# Patient Record
Sex: FEMALE | Race: WHITE
[De-identification: names, ages, dates, MRNs, and addresses within clinical notes are randomized per-mention and may not be internally consistent; named-entity substitution may affect disease eponyms.]

---

## 2017-12-11 ENCOUNTER — HOSPITAL ENCOUNTER (OUTPATIENT)
Dept: HOSPITAL 62 - OD | Age: 62
End: 2017-12-11
Attending: FAMILY MEDICINE
Payer: COMMERCIAL

## 2017-12-11 DIAGNOSIS — Z79.899: Primary | ICD-10-CM

## 2017-12-11 LAB
ALBUMIN SERPL-MCNC: 3.9 G/DL (ref 3.5–5)
ALP SERPL-CCNC: 114 U/L (ref 38–126)
ALT SERPL-CCNC: 53 U/L (ref 9–52)
ANION GAP SERPL CALC-SCNC: 14 MMOL/L (ref 5–19)
AST SERPL-CCNC: 42 U/L (ref 14–36)
BASOPHILS # BLD AUTO: 0 10^3/UL (ref 0–0.2)
BASOPHILS NFR BLD AUTO: 0.4 % (ref 0–2)
BILIRUB DIRECT SERPL-MCNC: 0.5 MG/DL (ref 0–0.4)
BILIRUB SERPL-MCNC: 1.1 MG/DL (ref 0.2–1.3)
BUN SERPL-MCNC: 15 MG/DL (ref 7–20)
CALCIUM: 8.8 MG/DL (ref 8.4–10.2)
CHLORIDE SERPL-SCNC: 105 MMOL/L (ref 98–107)
CHOLEST SERPL-MCNC: 158.98 MG/DL (ref 0–200)
CO2 SERPL-SCNC: 25 MMOL/L (ref 22–30)
CREAT SERPL-MCNC: 0.77 MG/DL (ref 0.52–1.25)
DIRECT HDL: 49 MG/DL (ref 40–?)
EOSINOPHIL # BLD AUTO: 0.1 10^3/UL (ref 0–0.6)
EOSINOPHIL NFR BLD AUTO: 1.3 % (ref 0–6)
ERYTHROCYTE [DISTWIDTH] IN BLOOD BY AUTOMATED COUNT: 15.1 % (ref 11.5–14)
GLUCOSE SERPL-MCNC: 169 MG/DL (ref 75–110)
HCT VFR BLD CALC: 44 % (ref 36–47)
HGB BLD-MCNC: 14.6 G/DL (ref 12–15.5)
HGB HCT DIFFERENCE: -0.2
LDLC SERPL DIRECT ASSAY-MCNC: 87 MG/DL (ref ?–100)
LYMPHOCYTES # BLD AUTO: 0.8 10^3/UL (ref 0.5–4.7)
LYMPHOCYTES NFR BLD AUTO: 11.2 % (ref 13–45)
MCH RBC QN AUTO: 28.9 PG (ref 27–33.4)
MCHC RBC AUTO-ENTMCNC: 33.3 G/DL (ref 32–36)
MCV RBC AUTO: 87 FL (ref 80–97)
MONOCYTES # BLD AUTO: 0.4 10^3/UL (ref 0.1–1.4)
MONOCYTES NFR BLD AUTO: 5.2 % (ref 3–13)
NEUTROPHILS # BLD AUTO: 5.7 10^3/UL (ref 1.7–8.2)
NEUTS SEG NFR BLD AUTO: 81.9 % (ref 42–78)
POTASSIUM SERPL-SCNC: 3.9 MMOL/L (ref 3.6–5)
PROT SERPL-MCNC: 7.1 G/DL (ref 6.3–8.2)
RBC # BLD AUTO: 5.07 10^6/UL (ref 3.72–5.28)
SODIUM SERPL-SCNC: 143.7 MMOL/L (ref 137–145)
TRIGL SERPL-MCNC: 117 MG/DL (ref ?–150)
VLDLC SERPL CALC-MCNC: 23 MG/DL (ref 10–31)
WBC # BLD AUTO: 7 10^3/UL (ref 4–10.5)

## 2017-12-11 PROCEDURE — 80061 LIPID PANEL: CPT

## 2017-12-11 PROCEDURE — 36415 COLL VENOUS BLD VENIPUNCTURE: CPT

## 2017-12-11 PROCEDURE — 82043 UR ALBUMIN QUANTITATIVE: CPT

## 2017-12-11 PROCEDURE — 80053 COMPREHEN METABOLIC PANEL: CPT

## 2017-12-11 PROCEDURE — 83036 HEMOGLOBIN GLYCOSYLATED A1C: CPT

## 2017-12-11 PROCEDURE — 82570 ASSAY OF URINE CREATININE: CPT

## 2017-12-11 PROCEDURE — 84443 ASSAY THYROID STIM HORMONE: CPT

## 2017-12-11 PROCEDURE — 85025 COMPLETE CBC W/AUTO DIFF WBC: CPT

## 2017-12-12 LAB
CREAT UR-MCNC: 212.3 MG/DL
MICROALBUMIN UR-MCNC: 63.8 UG/ML

## 2018-02-16 ENCOUNTER — HOSPITAL ENCOUNTER (OUTPATIENT)
Dept: HOSPITAL 62 - OD | Age: 63
End: 2018-02-16
Attending: FAMILY MEDICINE
Payer: COMMERCIAL

## 2018-02-16 DIAGNOSIS — R79.89: ICD-10-CM

## 2018-02-16 DIAGNOSIS — E11.9: Primary | ICD-10-CM

## 2018-02-16 DIAGNOSIS — Z79.899: ICD-10-CM

## 2018-02-16 LAB
ALT SERPL-CCNC: 38 U/L (ref 9–52)
AST SERPL-CCNC: 37 U/L (ref 14–36)

## 2018-02-16 PROCEDURE — 84460 ALANINE AMINO (ALT) (SGPT): CPT

## 2018-02-16 PROCEDURE — 84450 TRANSFERASE (AST) (SGOT): CPT

## 2018-02-16 PROCEDURE — 36415 COLL VENOUS BLD VENIPUNCTURE: CPT

## 2018-02-16 PROCEDURE — 83036 HEMOGLOBIN GLYCOSYLATED A1C: CPT

## 2018-04-04 ENCOUNTER — HOSPITAL ENCOUNTER (OUTPATIENT)
Dept: HOSPITAL 62 - OD | Age: 63
End: 2018-04-04
Attending: FAMILY MEDICINE
Payer: COMMERCIAL

## 2018-04-04 DIAGNOSIS — M51.37: ICD-10-CM

## 2018-04-04 DIAGNOSIS — M54.5: Primary | ICD-10-CM

## 2018-04-04 PROCEDURE — 72110 X-RAY EXAM L-2 SPINE 4/>VWS: CPT

## 2018-04-04 PROCEDURE — 72220 X-RAY EXAM SACRUM TAILBONE: CPT

## 2018-04-04 NOTE — RADIOLOGY REPORT (SQ)
EXAM DESCRIPTION:  LUMBAR SPINE COMPLETE



COMPLETED DATE/TIME:  4/4/2018 8:18 am



REASON FOR STUDY:  LOW BACK PAIN



COMPARISON:  Sacrum films same day



NUMBER OF VIEWS:  Five views including obliques.



TECHNIQUE:  AP, lateral, oblique, and sacral radiographic images acquired of the lumbar spine.



LIMITATIONS:  Obese patient



FINDINGS:  MINERALIZATION: Normal.

SEGMENTATION: Normal.  No transitional anatomy.

ALIGNMENT: Normal.

VERTEBRAE: Maintained height.  No fracture or worrisome bone lesion.

DISCS: High-grade disc space loss of height at T11-12, L2-3, and L5-S1.  Moderate disc space narrowin
g at L3-4 and L4-5.

POSTERIOR ELEMENTS: Pedicles and facets are intact.  No pars defect or posterior arch defects.  Moder
ate bilateral facet arthropathy at L5-S1

HARDWARE: None in the spine.

PARASPINAL SOFT TISSUES: Normal.

PELVIS: Not included in the field of view

OTHER: No other significant finding.



IMPRESSION:  Multilevel degenerative disc changes



TECHNICAL DOCUMENTATION:  JOB ID:  [id number]

 2011 [employer]- All Rights Reserved



Reading location - IP/workstation name: [email or web address]

## 2018-04-04 NOTE — RADIOLOGY REPORT (SQ)
EXAM DESCRIPTION:  SACRUM AND COCCYX



COMPLETED DATE/TIME:  4/4/2018 8:18 am



REASON FOR STUDY:  LOW BACK PAIN



COMPARISON:  Lumbar spine films 4/4/2018



NUMBER OF VIEWS:  Three views.



TECHNIQUE:  AP, lateral, and tilt views of the sacrum and coccyx.



LIMITATIONS:  Obese patient, limited visualization



FINDINGS:  Limited study due to patient body habitus.

There is no acute fracture or malalignment.

High-grade disc space loss of height at L5-S1.

Angulation of the sacrococcygeal junction at the sacrococcygeal joint.  This is likely chronic.

There is vacuum phenomenon at the SI joints and symphysis pubis.



IMPRESSION:  Vacuum joint changes at the SI joints and symphysis pubis



TECHNICAL DOCUMENTATION:  JOB ID:  [id number]

 2011 [employer]- All Rights Reserved



Reading location - IP/workstation name: [email or web address]

## 2018-07-02 ENCOUNTER — HOSPITAL ENCOUNTER (OUTPATIENT)
Dept: HOSPITAL 62 - OD | Age: 63
End: 2018-07-02
Attending: FAMILY MEDICINE
Payer: COMMERCIAL

## 2018-07-02 DIAGNOSIS — E11.9: Primary | ICD-10-CM

## 2018-07-02 DIAGNOSIS — Z79.899: ICD-10-CM

## 2018-07-02 DIAGNOSIS — E66.9: ICD-10-CM

## 2018-07-02 DIAGNOSIS — I10: ICD-10-CM

## 2018-07-02 LAB
ADD MANUAL DIFF: NO
ALBUMIN SERPL-MCNC: 3.7 G/DL (ref 3.5–5)
ALP SERPL-CCNC: 99 U/L (ref 38–126)
ALT SERPL-CCNC: 27 U/L (ref 9–52)
ANION GAP SERPL CALC-SCNC: 14 MMOL/L (ref 5–19)
AST SERPL-CCNC: 40 U/L (ref 14–36)
BASOPHILS # BLD AUTO: 0 10^3/UL (ref 0–0.2)
BASOPHILS NFR BLD AUTO: 0.5 % (ref 0–2)
BILIRUB DIRECT SERPL-MCNC: 0.4 MG/DL (ref 0–0.4)
BILIRUB SERPL-MCNC: 0.8 MG/DL (ref 0.2–1.3)
BUN SERPL-MCNC: 17 MG/DL (ref 7–20)
CALCIUM: 9.2 MG/DL (ref 8.4–10.2)
CHLORIDE SERPL-SCNC: 103 MMOL/L (ref 98–107)
CHOLEST SERPL-MCNC: 154.94 MG/DL (ref 0–200)
CO2 SERPL-SCNC: 26 MMOL/L (ref 22–30)
EOSINOPHIL # BLD AUTO: 0.1 10^3/UL (ref 0–0.6)
EOSINOPHIL NFR BLD AUTO: 1.2 % (ref 0–6)
ERYTHROCYTE [DISTWIDTH] IN BLOOD BY AUTOMATED COUNT: 15.9 % (ref 11.5–14)
GLUCOSE SERPL-MCNC: 134 MG/DL (ref 75–110)
HCT VFR BLD CALC: 42.1 % (ref 36–47)
HGB BLD-MCNC: 14.3 G/DL (ref 12–15.5)
LDLC SERPL DIRECT ASSAY-MCNC: 80 MG/DL (ref ?–100)
LYMPHOCYTES # BLD AUTO: 0.8 10^3/UL (ref 0.5–4.7)
LYMPHOCYTES NFR BLD AUTO: 15.6 % (ref 13–45)
MCH RBC QN AUTO: 29.3 PG (ref 27–33.4)
MCHC RBC AUTO-ENTMCNC: 33.8 G/DL (ref 32–36)
MCV RBC AUTO: 87 FL (ref 80–97)
MONOCYTES # BLD AUTO: 0.3 10^3/UL (ref 0.1–1.4)
MONOCYTES NFR BLD AUTO: 5.8 % (ref 3–13)
NEUTROPHILS # BLD AUTO: 4 10^3/UL (ref 1.7–8.2)
NEUTS SEG NFR BLD AUTO: 76.9 % (ref 42–78)
PLATELET # BLD: 226 10^3/UL (ref 150–450)
POTASSIUM SERPL-SCNC: 3.6 MMOL/L (ref 3.6–5)
PROT SERPL-MCNC: 7 G/DL (ref 6.3–8.2)
RBC # BLD AUTO: 4.87 10^6/UL (ref 3.72–5.28)
SODIUM SERPL-SCNC: 142.6 MMOL/L (ref 137–145)
TOTAL CELLS COUNTED % (AUTO): 100 %
TRIGL SERPL-MCNC: 146 MG/DL (ref ?–150)
VLDLC SERPL CALC-MCNC: 29 MG/DL (ref 10–31)
WBC # BLD AUTO: 5.2 10^3/UL (ref 4–10.5)

## 2018-07-02 PROCEDURE — 80061 LIPID PANEL: CPT

## 2018-07-02 PROCEDURE — 80053 COMPREHEN METABOLIC PANEL: CPT

## 2018-07-02 PROCEDURE — 36415 COLL VENOUS BLD VENIPUNCTURE: CPT

## 2018-07-02 PROCEDURE — 83036 HEMOGLOBIN GLYCOSYLATED A1C: CPT

## 2018-07-02 PROCEDURE — 85025 COMPLETE CBC W/AUTO DIFF WBC: CPT

## 2018-07-02 PROCEDURE — 82043 UR ALBUMIN QUANTITATIVE: CPT

## 2018-07-02 PROCEDURE — 82570 ASSAY OF URINE CREATININE: CPT

## 2018-07-02 PROCEDURE — 84443 ASSAY THYROID STIM HORMONE: CPT

## 2018-07-03 LAB
CREAT UR-MCNC: 195.8 MG/DL
MICROALBUMIN UR-MCNC: 37.5 UG/ML

## 2018-11-28 ENCOUNTER — HOSPITAL ENCOUNTER (INPATIENT)
Dept: HOSPITAL 62 - ER | Age: 63
LOS: 6 days | Discharge: HOME HEALTH | DRG: 189 | End: 2018-12-04
Attending: INTERNAL MEDICINE | Admitting: INTERNAL MEDICINE
Payer: COMMERCIAL

## 2018-11-28 DIAGNOSIS — Z90.49: ICD-10-CM

## 2018-11-28 DIAGNOSIS — I10: ICD-10-CM

## 2018-11-28 DIAGNOSIS — Z79.84: ICD-10-CM

## 2018-11-28 DIAGNOSIS — J96.01: Primary | ICD-10-CM

## 2018-11-28 DIAGNOSIS — Z87.891: ICD-10-CM

## 2018-11-28 DIAGNOSIS — E11.9: ICD-10-CM

## 2018-11-28 DIAGNOSIS — J45.909: ICD-10-CM

## 2018-11-28 DIAGNOSIS — Z71.3: ICD-10-CM

## 2018-11-28 DIAGNOSIS — K21.9: ICD-10-CM

## 2018-11-28 DIAGNOSIS — E66.01: ICD-10-CM

## 2018-11-28 DIAGNOSIS — Z79.899: ICD-10-CM

## 2018-11-28 LAB
A TYPE INFLUENZA AG: NEGATIVE
ADD MANUAL DIFF: NO
ALBUMIN SERPL-MCNC: 4 G/DL (ref 3.5–5)
ALP SERPL-CCNC: 127 U/L (ref 38–126)
ALT SERPL-CCNC: 30 U/L (ref 9–52)
ANION GAP SERPL CALC-SCNC: 14 MMOL/L (ref 5–19)
ARTERIAL BLOOD FIO2: (no result)
ARTERIAL BLOOD H2CO3: 1.31 MMOL/L (ref 1.05–1.35)
ARTERIAL BLOOD HCO3: 28.7 MMOL/L (ref 20–24)
ARTERIAL BLOOD PCO2: 43.4 MMHG (ref 35–45)
ARTERIAL BLOOD PH: 7.44 (ref 7.35–7.45)
ARTERIAL BLOOD PO2: 107.2 MMHG (ref 80–100)
ARTERIAL BLOOD TOTAL CO2: 30.1 MMOL/L (ref 21–25)
AST SERPL-CCNC: 53 U/L (ref 14–36)
B INFLUENZA AG: NEGATIVE
BASE EXCESS BLDA CALC-SCNC: 4.1 MMOL/L
BASE EXCESS BLDV CALC-SCNC: 1.4 MMOL/L
BASOPHILS # BLD AUTO: 0 10^3/UL (ref 0–0.2)
BASOPHILS NFR BLD AUTO: 0.4 % (ref 0–2)
BILIRUB DIRECT SERPL-MCNC: 0.3 MG/DL (ref 0–0.4)
BILIRUB SERPL-MCNC: 1 MG/DL (ref 0.2–1.3)
BUN SERPL-MCNC: 19 MG/DL (ref 7–20)
CALCIUM: 9 MG/DL (ref 8.4–10.2)
CHLORIDE SERPL-SCNC: 100 MMOL/L (ref 98–107)
CO2 SERPL-SCNC: 27 MMOL/L (ref 22–30)
EOSINOPHIL # BLD AUTO: 0.1 10^3/UL (ref 0–0.6)
EOSINOPHIL NFR BLD AUTO: 1.3 % (ref 0–6)
ERYTHROCYTE [DISTWIDTH] IN BLOOD BY AUTOMATED COUNT: 15.3 % (ref 11.5–14)
GLUCOSE SERPL-MCNC: 176 MG/DL (ref 75–110)
HCO3 BLDV-SCNC: 26.7 MMOL/L (ref 20–32)
HCT VFR BLD CALC: 43.4 % (ref 36–47)
HGB BLD-MCNC: 14.3 G/DL (ref 12–15.5)
LYMPHOCYTES # BLD AUTO: 1.1 10^3/UL (ref 0.5–4.7)
LYMPHOCYTES NFR BLD AUTO: 13 % (ref 13–45)
MCH RBC QN AUTO: 28.5 PG (ref 27–33.4)
MCHC RBC AUTO-ENTMCNC: 32.9 G/DL (ref 32–36)
MCV RBC AUTO: 87 FL (ref 80–97)
MONOCYTES # BLD AUTO: 0.6 10^3/UL (ref 0.1–1.4)
MONOCYTES NFR BLD AUTO: 6.9 % (ref 3–13)
NEUTROPHILS # BLD AUTO: 6.3 10^3/UL (ref 1.7–8.2)
NEUTS SEG NFR BLD AUTO: 78.4 % (ref 42–78)
PCO2 BLDV: 44.8 MMHG (ref 35–63)
PH BLDV: 7.39 [PH] (ref 7.3–7.42)
PLATELET # BLD: 428 10^3/UL (ref 150–450)
POTASSIUM SERPL-SCNC: 3.7 MMOL/L (ref 3.6–5)
PROT SERPL-MCNC: 8.1 G/DL (ref 6.3–8.2)
RBC # BLD AUTO: 5.02 10^6/UL (ref 3.72–5.28)
SAO2 % BLDA: 98 % (ref 94–98)
SODIUM SERPL-SCNC: 141 MMOL/L (ref 137–145)
TOTAL CELLS COUNTED % (AUTO): 100 %
WBC # BLD AUTO: 8 10^3/UL (ref 4–10.5)

## 2018-11-28 PROCEDURE — 80048 BASIC METABOLIC PNL TOTAL CA: CPT

## 2018-11-28 PROCEDURE — 80053 COMPREHEN METABOLIC PANEL: CPT

## 2018-11-28 PROCEDURE — 87205 SMEAR GRAM STAIN: CPT

## 2018-11-28 PROCEDURE — 93010 ELECTROCARDIOGRAM REPORT: CPT

## 2018-11-28 PROCEDURE — 87804 INFLUENZA ASSAY W/OPTIC: CPT

## 2018-11-28 PROCEDURE — 85025 COMPLETE CBC W/AUTO DIFF WBC: CPT

## 2018-11-28 PROCEDURE — 87186 SC STD MICRODIL/AGAR DIL: CPT

## 2018-11-28 PROCEDURE — 84484 ASSAY OF TROPONIN QUANT: CPT

## 2018-11-28 PROCEDURE — 5A09357 ASSISTANCE WITH RESPIRATORY VENTILATION, LESS THAN 24 CONSECUTIVE HOURS, CONTINUOUS POSITIVE AIRWAY PRESSURE: ICD-10-PCS | Performed by: INTERNAL MEDICINE

## 2018-11-28 PROCEDURE — 87070 CULTURE OTHR SPECIMN AEROBIC: CPT

## 2018-11-28 PROCEDURE — 99285 EMERGENCY DEPT VISIT HI MDM: CPT

## 2018-11-28 PROCEDURE — 94660 CPAP INITIATION&MGMT: CPT

## 2018-11-28 PROCEDURE — 36600 WITHDRAWAL OF ARTERIAL BLOOD: CPT

## 2018-11-28 PROCEDURE — 83735 ASSAY OF MAGNESIUM: CPT

## 2018-11-28 PROCEDURE — 82962 GLUCOSE BLOOD TEST: CPT

## 2018-11-28 PROCEDURE — 94640 AIRWAY INHALATION TREATMENT: CPT

## 2018-11-28 PROCEDURE — 96365 THER/PROPH/DIAG IV INF INIT: CPT

## 2018-11-28 PROCEDURE — 87493 C DIFF AMPLIFIED PROBE: CPT

## 2018-11-28 PROCEDURE — S0028 INJECTION, FAMOTIDINE, 20 MG: HCPCS

## 2018-11-28 PROCEDURE — 93306 TTE W/DOPPLER COMPLETE: CPT

## 2018-11-28 PROCEDURE — 93005 ELECTROCARDIOGRAM TRACING: CPT

## 2018-11-28 PROCEDURE — 71045 X-RAY EXAM CHEST 1 VIEW: CPT

## 2018-11-28 PROCEDURE — 82803 BLOOD GASES ANY COMBINATION: CPT

## 2018-11-28 PROCEDURE — 87077 CULTURE AEROBIC IDENTIFY: CPT

## 2018-11-28 PROCEDURE — 84100 ASSAY OF PHOSPHORUS: CPT

## 2018-11-28 PROCEDURE — 90686 IIV4 VACC NO PRSV 0.5 ML IM: CPT

## 2018-11-28 PROCEDURE — 36415 COLL VENOUS BLD VENIPUNCTURE: CPT

## 2018-11-28 PROCEDURE — S0164 INJECTION PANTROPRAZOLE: HCPCS

## 2018-11-28 PROCEDURE — 70360 X-RAY EXAM OF NECK: CPT

## 2018-11-28 RX ADMIN — Medication SCH ML: at 21:58

## 2018-11-28 RX ADMIN — DOXYCYCLINE SCH MLS/HR: 100 INJECTION, POWDER, LYOPHILIZED, FOR SOLUTION INTRAVENOUS at 21:58

## 2018-11-28 RX ADMIN — Medication SCH ML: at 18:03

## 2018-11-28 RX ADMIN — ENOXAPARIN SODIUM SCH MG: 40 INJECTION SUBCUTANEOUS at 09:10

## 2018-11-28 RX ADMIN — Medication SCH ML: at 07:33

## 2018-11-28 NOTE — RADIOLOGY REPORT (SQ)
PROCEDURE: XR CHEST 1 VIEW



HISTORY: dyspnea



COMPARISON: None 



TECHNIQUE: 



Single projection of the chest was done.



FINDINGS:



The current study is partly lordotic in appearance. Benign

elevation of the right hemidiaphragm is noted .



There are no discrete airspace infiltrates, pneumothoraces or

pleural effusions.



The pulmonary vascularity is normal.



The cardiomediastinal silhouette is unremarkable for patient's

age and sex.



IMPRESSION: 



There is no acute pleural-parenchymal process seen in the imaged

lung fields.



Location of Interpretation: Teleradiology

## 2018-11-28 NOTE — PDOC H&P
History of Present Illness


Admission Date/PCP: 


  11/28/18 03:14





  DAVE HARPER MD





History of Present Illness: 


LILIA HAMILTON is a 63 year old female with morbid obesity, diabetes mellitus, 

asthma, hypertension, presents to the emergency department with complaint of 

difficulty breathing.  She says that for approximately a week she has had some 

upper nasal congestion a lot of phlegm in her throat.  She says this happens 

every year around this time a year.  Patient went to her doctor was placed on 

antibiotics and she said her symptoms clear.  After she stopped taking 

antibiotics her symptoms return and her breathing progressively got worse until 

tonight where she is having large amount of difficulty breathing.  Per medics 

arrived her oxygen saturation was around 90-92% with inital albuterol treatment 

going.  The gave her breathing treatments including racemic epi and duonebs. 

Paramedics gave her 125mg of Solu-medrol. They Said she had both some lower and 

upper airway congestion therefore they gave her racemic epi as well as 

albuterol.  This seemed to help her tachypnea however the patient still feels 

short of breath.  Patient denies any history of any cardiac issues other than 

what sounds to be SVT but she said she had a heart rapid arrhythmia 

intermittently when she was in her 20s and placed on verapamil which has 

stopped that.  Patient also has history of fluid retention and therefore is on 

diuretics.  She is unsure if she had fevers at home.  No abdominal pain.  No 

chest pain.  No vomiting.  No other complaints at this time.





At the time I went to see her she was on BiPAP secondary to her respiratory 

distress, unable to give any history at this point, most of them was taken from 

the ED attending note.  Patient is non-smoker.  Oxygen saturation was 97% on 

BiPAP 12/6.  Lungs clear by the time of my exam.





Past Medical History


Cardiac Medical History: Reports: Hypertension - medicated, Other - Arrhythmia, 

unknown which one Morbid obesity


Pulmonary Medical History: Reports: Asthma


Endocrine Medical History: Reports: Diabetes Mellitus Type 2


GI Medical History: 


   Denies: Hepatitis, Hiatal Hernia


Hematology: 


   Denies: Anemia, Sickle Cell Disease





Past Surgical History


Past Surgical History: 


   Denies: Amputation, Hysterectomy, Mastectomy, Pacemaker





Social History


Information Source: Patient


Smoking Status: Never Smoker


Frequency of Alcohol Use: None


Hx Recreational Drug Use: No


Hx Prescription Drug Abuse: No





Family History


Family History: Reviewed & Not Pertinent


Parental Family History Reviewed: Yes - As above


Children Family History Reviewed: Yes


Sibling(s) Family History Reviewed.: Yes





Medication/Allergy


Home Medications: 








Cyclobenzaprine HCl [Flexeril 10 mg Tablet] 10 mg PO TID 04/29/14 


Triamterene/Hydrochlorothiazid [Triamterene-Hctz 75-50 mg Tab] 1 each PO DAILY 

04/29/14 


Verapamil HCl [Verapamil ER Pm] 300 mg PO QHS 04/29/14 








Allergies/Adverse Reactions: 


 





No Known Allergies Allergy (Unverified 04/29/14 11:24)


 











Review of Systems


Review of Systems: 





As outlined above, others negative





Physical Exam


Vital Signs: 


 











Temp Pulse Resp BP Pulse Ox


 


 97.9 F   93   23 H  144/75 H  95 


 


 11/28/18 01:02  11/28/18 02:02  11/28/18 03:02  11/28/18 03:02  11/28/18 03:02











Additional comments: 





General appearance: Morbid obese, alert and cooperative, and appears to be in 

no acute distress.  Wearing BiPAP


Head: Normocephalic


Eyes: PEERL, EOMI, vision is grossly intact.  Ears: External auditory canal and 

tympanic membranes clear, hearing grossly intact.  Nose: No nasal discharge.  

Throat: Oral cavity and pharynx normal.  No inflammation, swelling, exudate or 

lesions.


Neck: Neck supple, nontender without lymphadenopathy, masses or thyromegaly.


Cardiac: Normal S1 and S2.  No S3, S4 or murmurs.  Rhythm is regular.  There is 

no cyanosis or pallor.  Extremities are warm and well perfused.  Capillary 

refill is less than 2 seconds.  No carotid bruits.


Lungs: Clear to auscultation and percussion without rales, rhonchi, wheezing or 

diminished breath sounds, distant breath sounds.  Not using accessory muscles.


Abdomen: Positive bowel sounds.  Firm and obese.  Nondistended, nontender.  No 

guarding or rebound.  No masses.  Unable to evaluate for hepatosplenomegaly due 

to body habitus


Extremities: Deformity secondary to morbid obesity.  Edema in lower 

extremities.  Peripheral pulses intact.  


Neurological: Cranial nerves II through XII grossly intact.  Moves all 4 

extremities


Skin: Skin pale, normal texture and turgor with no lesions or eruptions, warm 

and dry.


Psychiatric:  The mental examination revealed the patient was oriented to person

, place, and time.  The patient was able to demonstrate good judgment on recent

, without hallucinations, abnormal affect or abnormal behaviors.





Results


Laboratory Results: 





 











  11/28/18 11/28/18 11/28/18





  00:43 00:43 00:43


 


WBC  8.0  


 


RBC  5.02  


 


Hgb  14.3  


 


Hct  43.4  


 


MCV  87  


 


MCH  28.5  


 


MCHC  32.9  


 


RDW  15.3 H  


 


Plt Count  428  


 


Seg Neutrophils %  78.4 H  


 


Lymphocytes %  13.0  


 


Monocytes %  6.9  


 


Eosinophils %  1.3  


 


Basophils %  0.4  


 


Absolute Neutrophils  6.3  


 


Absolute Lymphocytes  1.1  


 


Absolute Monocytes  0.6  


 


Absolute Eosinophils  0.1  


 


Absolute Basophils  0.0  


 


VBG pH    7.39


 


VBG pCO2    44.8


 


VBG HCO3    26.7


 


VBG Base Excess    1.4


 


Sodium   141.0 


 


Potassium   3.7 


 


Chloride   100 


 


Carbon Dioxide   27 


 


Anion Gap   14 


 


BUN   19 


 


Creatinine   0.86 


 


Est GFR ( Amer)   > 60 


 


Est GFR (Non-Af Amer)   > 60 


 


Glucose   176 H 


 


Calcium   9.0 


 


Total Bilirubin   1.0 


 


Direct Bilirubin   0.3 


 


AST   53 H 


 


ALT   30 


 


Alkaline Phosphatase   127 H 


 


Total Protein   8.1 


 


Albumin   4.0 








EKG Comments: 





Sinus rhythm with a ventricular rate of 90 bpm, T wave inversion in anterior 

leads like prior EKG


Impressions: 


 





Soft Tissue Neck X-Ray  11/28/18 00:35


IMPRESSION:


 


 


1. No definite acute abnormality in the neck soft tissues by


plain film criteria.


 


 


copyright 2011 [employer]- All Rights Reserved


 








Chest X-Ray  11/28/18 00:36


IMPRESSION: 


 


There is no acute pleural-parenchymal process seen in the imaged


lung fields.


 


Location of Interpretation: Teleradiology


 














Assessment & Plan





- Diagnosis


(1) Acute respiratory failure with hypoxia


Is this a current diagnosis for this admission?: Yes   


Plan: 


Patient comes in from home via EMS with respiratory distress, her initial 

oxygen saturation was 92% on room air, subsequently was 198% on 2 L oxygen via 

nasal cannula, later on was 95% on 4 L via nasal cannula, the ED attending 

tells me that she was in evident respiratory distress and was the main reason 

she was started on BiPAP, by the time I went to see her she was unable to 

answer my questions properly secondary to respiratory distress.


The etiology is unclear and possible multifactorial, so far the chest x-ray is 

negative, soft tissue neck x-ray negative and the patient has no stridor.  I 

will keep the patient on BiPAP overnight and place her on nebulizer treatments 

as needed, I will start her on IV doxycycline.  We will try to wean her to 

oxygen protocol in the morning.  N.p.o. for now.  Bedrest.


VBG 7.3/44, we are doing an ABG in the morning.








(2) Morbid obesity


Is this a current diagnosis for this admission?: Yes   


Plan: 


Likely contributing to her symptomatology, her BMI is 81.7








(3) Hypertension


Qualifiers: 


   Hypertension type: essential hypertension   Qualified Code(s): I10 - 

Essential (primary) hypertension   


Is this a current diagnosis for this admission?: Yes   


Plan: 


Resume home antihypertensive medications.








(4) Diabetes mellitus type 2 in obese


Is this a current diagnosis for this admission?: Yes   


Plan: 


Accu-Cheks every 6 hours, insulin lispro every 6 hours and hypoglycemia 

protocol.








(5) Bronchial asthma


Qualifiers: 


   Asthma severity: unspecified severity 


Is this a current diagnosis for this admission?: Yes   


Plan: 


Probably initially a contributor of her symptomatology as well, patient has 

likely improve with treatment given by EMS, by the time I went to see her her 

lungs seems to be clear.








(6) DVT prophylaxis


Is this a current diagnosis for this admission?: Yes   


Plan: 


Lovenox








- Time


Time Spent: 50 to 70 Minutes





- Inpatient Certification


Based on my medical assessment, after consideration of the patient's 

comorbidities, presenting symptoms, or acuity I expect that the services needed 

warrant INPATIENT care.: Yes


I certify that my determination is in accordance with my understanding of 

Medicare's requirements for reasonable and necessary INPATIENT services [42 CFR 

412.3e].: Yes


Medical Necessity: Risk of Complication if Not Cared For in Hospital - Acute 

hypoxic respiratory failure with respiratory arrest

## 2018-11-28 NOTE — ER DOCUMENT REPORT
ED General





- General


Stated Complaint: DIFFICULTY BREATHING


Time Seen by Provider: 11/28/18 00:35


Notes: 


Patient is a 63-year-old female presents with complaint of difficulty 

breathing.  She says that for approximately a week she has had some upper nasal 

congestion a lot of phlegm in her throat.  She says this happens every year 

around this time a year.  Patient went to her doctor was placed on antibiotics 

and she said her symptoms clear.  After she stopped taking antibiotics her 

symptoms return and her breathing progressively got worse until tonight where 

she is having large amount of difficulty breathing.  Per medics arrived her 

oxygen saturation was around 90-92% with inital albuterol treatment going.  The 

gave her breathing treatments including racemic epi and duonebs. Paramedics 

gave her 125mg of Solu-medrol. They Said she had both some lower and upper 

airway congestion therefore they gave her racemic epi as well as albuterol.  

This seemed to help her tachypnea however the patient still feels short of 

breath.  Patient denies any history of any cardiac issues other than what 

sounds to be SVT but she said she had a heart rapid arrhythmia intermittently 

when she was in her 20s and placed on verapamil which has stopped that.  

Patient also has history of fluid retention and therefore is on diuretics.  She 

is unsure if she had fevers at home.  No abdominal pain.  No chest pain.  No 

vomiting.  No other complaints at this time.





TRAVEL OUTSIDE OF THE [place] IN LAST 30 DAYS: No





- Related Data


Allergies/Adverse Reactions: 


 





No Known Allergies Allergy (Unverified 04/29/14 11:24)


 











Past Medical History





- Social History


Smoking Status: Former Smoker


Frequency of alcohol use: None


Drug Abuse: None


Family History: Reviewed & Not Pertinent





- Past Medical History


Cardiac Medical History: Reports: Hx Hypertension - medicated


   Denies: Hx Heart Attack


Pulmonary Medical History: 


   Denies: Hx Asthma


Neurological Medical History: Denies: Hx Cerebrovascular Accident, Hx Seizures


GI Medical History: Denies: Hx Hepatitis, Hx Hiatal Hernia, Hx Ulcer


Infectious Medical History: Denies: Hx Hepatitis


Past Surgical History: Denies: Hx Hysterectomy, Hx Mastectomy, Hx Open Heart 

Surgery, Hx Pacemaker





Review of Systems





- Review of Systems


Notes: 





My Normal Review Basic





REVIEW OF SYSTEMS:


CONSTITUTIONAL :  Denies fever,  chills, or sweats.  Denies recent illness.


EENT:   Nasal congestion.


CARDIOVASCULAR:  Denies chest pain.


RESPIRATORY: Cough and difficulty breathing.


GASTROINTESTINAL:  Denies abdominal pain.  Denies nausea, vomiting, or 

diarrhea.  


MUSCULOSKELETAL:  Denies neck or back pain or joint pain or swelling.


SKIN:   Denies rash or skin lesions.


NEUROLOGICAL:  Denies altered mental status or loss of consciousness.  


ALL OTHER SYSTEMS REVIEWED AND NEGATIVE.





Physical Exam





- Vital signs


Vitals: 


 











Temp Pulse Resp BP Pulse Ox


 


 97.9 F   99   26 H  149/81 H  95 


 


 11/28/18 00:27  11/28/18 00:27  11/28/18 00:27  11/28/18 00:27  11/28/18 00:27














- Notes


Notes: 





General Appearance: Well nourished, alert, cooperative, moderate acute distress

, no obvious discomfort.  Well-appearing.  No stridor at this time.


Vitals: reviewed, See vital signs table.


Head: no swelling or tenderness to the head


Eyes: PERRL, EOMI, Conjuctiva clear


Mouth: No decreasd moisture


Throat: No tonsillar inflammation, No airway obstruction,  No lymphadenopathy


Neck: Supple, no neck tenderness, No thyromegaly


Lungs: Bilateral rhonchus breath sounds.  Some  accessory muscle use.


Heart: Normal rate, Regular rythm, No murmur, no rub


Abdomen: Normal BS, soft, No rigidity, No abdominal tenderness, No guarding, no 

rebound, very obese abdomen.


Extremities:  good pulses in all extremities, no swelling or tenderness in the 

extremities, 3+ bilateral lower extremity edema.


Skin: warm, dry, appropriate color, no rash


Neuro: speech clear, oriented x 3, normal affect, responds appropriately to 

questions.





Course





- Re-evaluation


Re-evalutation: 





11/28/18 02:08


Patient's friend stated that she started to feel like her breathing is starting 

to worsen again.  Nurses had started DuoNeb treatment I went in to evaluate the 

patient.  I listen to her neck.  She does not have any stridor.  She is moving 

air well through her upper airway without any signs of restriction.  She keeps 

saying she feels as if she has mucus blocking her airway that she cannot clear.

  X-rays are normal.  Lung feels still some rhonchorous breath sounds.  I do 

not think that she has any type of upper airway obstruction being that she has 

absolutely no stridor whatsoever with good air movement with auscultation of 

her upper airway.  I think a lot of problems that she probably has a large 

amount of mucus plugging in her lungs and she is very obese and therefore she 

is unable to actually cough adequately were taken in a deep breath to be able 

to clear her lungs adequately.  I will place her on BiPAP to see if positive 

pressure does help some with her breathing.


11/28/18 02:32








Patient is much more comfortable on the BiPAP.  She is in no distress now.  

Tachypnea is resolved.


11/28/18 02:53








Patient continues to do on the BiPAP.  Venous blood gas is normal.  I suspect 

patient has a lot of mucus plugging and her uncles that is causing her to have 

a difficulty breathing and have periods of distress.  Because she is so obese 

especially with a lot of her weight being across the midsection she is unable 

to use her diaphragm to cough or clear her lungs and that is why she gets 

difficulty breathing and periods of hypoxemia.  BiPAP is seem to make a huge 

difference for her.  Because of her significant distress off BiPAP I think is 

appropriate to admit her.  I did speak with Dr. Reyes, hospitalist, who agrees 

to evaluate the patient for admission.





Dictation of this chart was performed using voice recognition software; 

therefore, there may be some unintended grammatical errors.





- Vital Signs


Vital signs: 


 











Temp Pulse Resp BP Pulse Ox


 


 97.9 F   93   15   149/89 H  96 


 


 11/28/18 01:02  11/28/18 02:02  11/28/18 02:05  11/28/18 02:02  11/28/18 02:05














- Laboratory


Result Diagrams: 


 11/28/18 00:43





 11/28/18 00:43


Laboratory results interpreted by me: 


 











  11/28/18 11/28/18





  00:43 00:43


 


RDW  15.3 H 


 


Seg Neutrophils %  78.4 H 


 


Glucose   176 H


 


AST   53 H


 


Alkaline Phosphatase   127 H














- EKG Interpretation by Me


Additional EKG results interpreted by me: 





11/28/18 01:16


EKG is reviewed and interpreted by me.  EKG shows sinus rhythm with a rate of 

90 bpm.  No ST segment elevation or depression.  T wave inversions in leads V1, 

V2 and V3 which are unchanged comparison to previous EKG from April 30, 2014.  

CA interval and QRS duration are within normal range.  QT interval is prolonged.





Discharge





- Discharge


Clinical Impression: 


 Obesity with body mass index greater than 30





Dyspnea


Qualifiers:


 Dyspnea type: unspecified Qualified Code(s): R06.00 - Dyspnea, unspecified





Condition: Stable


Disposition: ADMITTED AS OBSERVATION


Admitting Provider: Hospitalist


Unit Admitted: IMCU


Referrals: 


DAVE HARPER MD [Primary Care Provider] - Follow up as needed

## 2018-11-28 NOTE — RADIOLOGY REPORT (SQ)
EXAM DESCRIPTION: 



XR NECK SOFT TISSUE



COMPLETED DATE/TME:  11/28/2018 00:35



CLINICAL HISTORY: 



63 years, Female, dyspnea



COMPARISON:

None.



FINDINGS:



2 views of the neck soft tissues. No definite narrowing of the

subglottic airway. No definite ballooning of the hypopharynx. No

definite enlargement of the epiglottis. No foreign body

identified. Degenerative change of the spine.



IMPRESSION:





1. No definite acute abnormality in the neck soft tissues by

plain film criteria.

 



copyright 2011 [employer]- All Rights Reserved

## 2018-11-28 NOTE — EKG REPORT
SEVERITY:- BORDERLINE ECG -

SINUS RHYTHM

RIGHT AXIS DEVIATION

BORDERLINE T ABNORMALITIES, ANTERIOR LEADS

BORDERLINE PROLONGED QT INTERVAL

:

Confirmed by: Deni Vasquez MD 28-Nov-2018 07:37:04

## 2018-11-29 LAB
ARTERIAL BLOOD FIO2: (no result)
ARTERIAL BLOOD FIO2: (no result)
ARTERIAL BLOOD H2CO3: 1.37 MMOL/L (ref 1.05–1.35)
ARTERIAL BLOOD H2CO3: 1.53 MMOL/L (ref 1.05–1.35)
ARTERIAL BLOOD HCO3: 27.4 MMOL/L (ref 20–24)
ARTERIAL BLOOD HCO3: 30.8 MMOL/L (ref 20–24)
ARTERIAL BLOOD PCO2: 45.6 MMHG (ref 35–45)
ARTERIAL BLOOD PCO2: 50.7 MMHG (ref 35–45)
ARTERIAL BLOOD PH: 7.4 (ref 7.35–7.45)
ARTERIAL BLOOD PH: 7.4 (ref 7.35–7.45)
ARTERIAL BLOOD PO2: 102.7 MMHG (ref 80–100)
ARTERIAL BLOOD PO2: 71.1 MMHG (ref 80–100)
ARTERIAL BLOOD TOTAL CO2: 28.8 MMOL/L (ref 21–25)
ARTERIAL BLOOD TOTAL CO2: 32.3 MMOL/L (ref 21–25)
BASE EXCESS BLDA CALC-SCNC: 2 MMOL/L
BASE EXCESS BLDA CALC-SCNC: 4.9 MMOL/L
SAO2 % BLDA: 94.2 % (ref 94–98)
SAO2 % BLDA: 97.6 % (ref 94–98)

## 2018-11-29 RX ADMIN — DOXYCYCLINE SCH MLS/HR: 100 INJECTION, POWDER, LYOPHILIZED, FOR SOLUTION INTRAVENOUS at 21:30

## 2018-11-29 RX ADMIN — LANSOPRAZOLE SCH: 30 TABLET, ORALLY DISINTEGRATING, DELAYED RELEASE ORAL at 13:15

## 2018-11-29 RX ADMIN — IPRATROPIUM BROMIDE AND ALBUTEROL SULFATE SCH ML: 2.5; .5 SOLUTION RESPIRATORY (INHALATION) at 14:40

## 2018-11-29 RX ADMIN — Medication SCH ML: at 05:08

## 2018-11-29 RX ADMIN — Medication SCH ML: at 18:32

## 2018-11-29 RX ADMIN — ENOXAPARIN SODIUM SCH MG: 40 INJECTION SUBCUTANEOUS at 10:09

## 2018-11-29 RX ADMIN — METHYLPREDNISOLONE SODIUM SUCCINATE SCH MG: 125 INJECTION, POWDER, FOR SOLUTION INTRAMUSCULAR; INTRAVENOUS at 18:32

## 2018-11-29 RX ADMIN — DOXYCYCLINE SCH MLS/HR: 100 INJECTION, POWDER, LYOPHILIZED, FOR SOLUTION INTRAVENOUS at 09:58

## 2018-11-29 RX ADMIN — Medication SCH ML: at 21:30

## 2018-11-29 RX ADMIN — SODIUM CHLORIDE PRN MLS/HR: 0.45 INJECTION, SOLUTION INTRAVENOUS at 21:35

## 2018-11-29 RX ADMIN — IPRATROPIUM BROMIDE AND ALBUTEROL SULFATE SCH ML: 2.5; .5 SOLUTION RESPIRATORY (INHALATION) at 20:41

## 2018-11-29 NOTE — PDOC PROGRESS REPORT
Subjective


Progress Note for:: 11/29/18


Subjective:: 





Patient states to being SOB, Hoarseness, minimal cough no fever, chills wheezing

, chest pain.Her oxygen sat % was 100% at time sinus at 87 beats per minute.


Reason For Visit: 


ACUTE HYPOXIC RESPIRATORY FAILURE








Physical Exam


Vital Signs: 


 











Temp Pulse Resp BP Pulse Ox


 


 97.5 F   85   16   130/76 H  100 


 


 11/29/18 08:11  11/29/18 08:11  11/29/18 08:11  11/29/18 08:11  11/29/18 08:11








 Intake & Output











 11/28/18 11/29/18 11/30/18





 06:59 06:59 06:59


 


Intake Total  250 


 


Output Total  0 


 


Balance  250 


 


Weight  213.5 kg 











General appearance: PRESENT: mild distress, morbidly obese


Head exam: PRESENT: atraumatic, normocephalic


Eye exam: PRESENT: conjunctiva pink, EOMI, PERRLA.  ABSENT: scleral icterus


Ear exam: PRESENT: normal external ear exam


Mouth exam: PRESENT: dry mucosa


Neck exam: PRESENT: full ROM.  ABSENT: carotid bruit, JVD, lymphadenopathy, 

thyromegaly


Respiratory exam: PRESENT: decreased breath sounds


Cardiovascular exam: PRESENT: RRR.  ABSENT: diastolic murmur, rubs, systolic 

murmur


Pulses: PRESENT: normal dorsalis pedis pul, +2 pedal pulses bilateral


Vascular exam: PRESENT: normal capillary refill


GI/Abdominal exam: PRESENT: normal bowel sounds, soft.  ABSENT: distended, 

guarding, mass, organolmegaly, rebound, tenderness


Rectal exam: PRESENT: deferred


Extremities exam: PRESENT: pedal edema


Musculoskeletal exam: PRESENT: ambulatory


Neurological exam: PRESENT: alert, awake, oriented to person, oriented to place

, oriented to time, oriented to situation, CN II-XII grossly intact.  ABSENT: 

motor sensory deficit


Psychiatric exam: PRESENT: appropriate affect, normal mood.  ABSENT: homicidal 

ideation, suicidal ideation


Skin exam: PRESENT: dry, intact, warm.  ABSENT: cyanosis, rash





Results


Laboratory Results: 


 











  11/28/18 11/29/18 11/29/18





  09:50 04:35 06:20


 


Carbonic Acid  1.31   1.53 H


 


HCO3/H2CO3 Ratio  21:1   20:1


 


ABG pH  7.44   7.40


 


ABG pCO2  43.4   50.7 H


 


ABG pO2  107.2 H   102.7 H


 


ABG HCO3  28.7 H   30.8 H


 


ABG O2 Saturation  98.0   97.6


 


ABG Base Excess  4.1   4.9


 


FiO2  40%   30%


 


Phosphorus   3.4 








 











  11/28/18 11/28/18





  07:15 13:08


 


Troponin I  < 0.012  < 0.012











Impressions: 


 





Soft Tissue Neck X-Ray  11/28/18 00:35


IMPRESSION:


 


 


1. No definite acute abnormality in the neck soft tissues by


plain film criteria.


 


 


copyright 2011 [employer]- All Rights Reserved


 








Chest X-Ray  11/28/18 00:36


IMPRESSION: 


 


There is no acute pleural-parenchymal process seen in the imaged


lung fields.


 


Location of Interpretation: [facility]


 














Assessment & Plan





- Diagnosis


(1) Acute respiratory failure with hypoxia


Is this a current diagnosis for this admission?: Yes   


Plan: 


will get ABG and c ontinue oxygen saturation, add duoneb nebulizer, decongestant

, solumedrol 125 mg then 60 mg Q6 hrs. Needs chest xray.








(2) DVT prophylaxis


Is this a current diagnosis for this admission?: Yes   





(3) Diabetes mellitus type 2 in obese


Is this a current diagnosis for this admission?: Yes   


Plan: 


continue accuchecks with humalog sliding scale








(4) Dyspnea


Qualifiers: 


   Dyspnea type: unspecified   Qualified Code(s): R06.00 - Dyspnea, unspecified

   


Is this a current diagnosis for this admission?: Yes   





(5) Hypertension


Qualifiers: 


   Hypertension type: essential hypertension   Qualified Code(s): I10 - 

Essential (primary) hypertension   


Is this a current diagnosis for this admission?: Yes   





(6) Morbid obesity


Is this a current diagnosis for this admission?: Yes   





- Time


Time Spent with patient: 15-24 minutes


Medications reviewed and adjusted accordingly: Yes


Anticipated discharge: Home





- Inpatient Certification


I certify that my determination is in accordance with my understanding of 

Medicare's requirements for reasonable and necessary INPATIENT services [42 CFR 

412.3e].: Yes


Medical Necessity: Failure to Improve With Outpatient Therapy, Significant 

Comorbidiites Make Outpatient Treatment Too Risky


Post Hospital Care: D/C Planner Documentation

## 2018-11-29 NOTE — PDOC PROGRESS REPORT
Subjective


Progress Note for:: 11/29/18


Subjective:: 





Patient states to being SOB, Hoarseness, minimal cough no fever, chills wheezing

, chest pain.Her oxygen sat % was 100% at time sinus at 87 beats per minute.


Reason For Visit: 


ACUTE HYPOXIC RESPIRATORY FAILURE








Physical Exam


Vital Signs: 


 











Temp Pulse Resp BP Pulse Ox


 


 97.5 F   85   16   130/76 H  100 


 


 11/29/18 08:11  11/29/18 08:11  11/29/18 08:11  11/29/18 08:11  11/29/18 08:11








 Intake & Output











 11/28/18 11/29/18 11/30/18





 06:59 06:59 06:59


 


Intake Total  250 


 


Output Total  0 


 


Balance  250 


 


Weight  213.5 kg 











General appearance: PRESENT: no acute distress, well-developed, well-nourished


Head exam: PRESENT: atraumatic, normocephalic


Eye exam: PRESENT: conjunctiva pink, EOMI, PERRLA.  ABSENT: scleral icterus


Ear exam: PRESENT: normal external ear exam


Mouth exam: PRESENT: dry mucosa


Throat exam: PRESENT: post pharyngeal erythema


Neck exam: PRESENT: full ROM.  ABSENT: carotid bruit, JVD, lymphadenopathy, 

thyromegaly


Respiratory exam: PRESENT: decreased breath sounds


Cardiovascular exam: PRESENT: RRR.  ABSENT: diastolic murmur, rubs, systolic 

murmur


Pulses: PRESENT: normal dorsalis pedis pul, +2 pedal pulses bilateral


Vascular exam: PRESENT: normal capillary refill


GI/Abdominal exam: PRESENT: normal bowel sounds, soft.  ABSENT: distended, 

guarding, mass, organolmegaly, rebound, tenderness


Rectal exam: PRESENT: deferred


Extremities exam: PRESENT: pedal edema


Neurological exam: PRESENT: alert, awake, oriented to person, oriented to place

, oriented to time, oriented to situation, CN II-XII grossly intact.  ABSENT: 

motor sensory deficit


Psychiatric exam: PRESENT: appropriate affect, normal mood.  ABSENT: homicidal 

ideation, suicidal ideation





Results


Laboratory Results: 


 











  11/28/18 11/29/18 11/29/18





  09:50 04:35 06:20


 


Carbonic Acid  1.31   1.53 H


 


HCO3/H2CO3 Ratio  21:1   20:1


 


ABG pH  7.44   7.40


 


ABG pCO2  43.4   50.7 H


 


ABG pO2  107.2 H   102.7 H


 


ABG HCO3  28.7 H   30.8 H


 


ABG O2 Saturation  98.0   97.6


 


ABG Base Excess  4.1   4.9


 


FiO2  40%   30%


 


Phosphorus   3.4 








 











  11/28/18 11/28/18





  07:15 13:08


 


Troponin I  < 0.012  < 0.012











Impressions: 


 





Soft Tissue Neck X-Ray  11/28/18 00:35


IMPRESSION:


 


 


1. No definite acute abnormality in the neck soft tissues by


plain film criteria.


 


 


copyright 2011 [employer]- All Rights Reserved


 








Chest X-Ray  11/28/18 00:36


IMPRESSION: 


 


There is no acute pleural-parenchymal process seen in the imaged


lung fields.


 


Location of Interpretation: [facility]


 














Assessment & Plan





- Diagnosis


(1) Acute respiratory failure with hypoxia


Is this a current diagnosis for this admission?: Yes   


Plan: 


WILL DO NOCTURNAL PULSE OXYIMETRY, IF SYMPTOMATIC WILL CHECK ABG, DISCUSSED 

WITH RESPIRATORY.








(2) DVT prophylaxis


Is this a current diagnosis for this admission?: Yes   





(3) Diabetes mellitus type 2 in obese


Is this a current diagnosis for this admission?: Yes   





(4) Dyspnea


Qualifiers: 


   Dyspnea type: unspecified   Qualified Code(s): R06.00 - Dyspnea, unspecified

   


Is this a current diagnosis for this admission?: Yes   





(5) Hypertension


Qualifiers: 


   Hypertension type: essential hypertension   Qualified Code(s): I10 - 

Essential (primary) hypertension   


Is this a current diagnosis for this admission?: Yes   





(6) Morbid obesity


Is this a current diagnosis for this admission?: Yes

## 2018-11-29 NOTE — RADIOLOGY REPORT (SQ)
EXAM DESCRIPTION:  CHEST SINGLE VIEW



COMPLETED DATE/TIME:  11/29/2018 10:00 am



REASON FOR STUDY:  pna



COMPARISON:  11/28/2018.



EXAM PARAMETERS:  NUMBER OF VIEWS: One view.

TECHNIQUE: Single frontal radiographic view of the chest acquired.

RADIATION DOSE: NA

LIMITATIONS: Study significantly limited due to the patient's obesity.



FINDINGS:  LUNGS AND PLEURA: Right lung appears generally clear.  Difficult to visualize the left marie
g due to rotation and marked obesity.

MEDIASTINUM AND HILAR STRUCTURES: No masses.  Contour normal.

HEART AND VASCULAR STRUCTURES: Heart normal in size.  Normal vasculature.

BONES: No acute findings.

HARDWARE: None in the chest.

OTHER: No other significant finding.



IMPRESSION:  LIMITED STUDY.  CANNOT EXCLUDE INFILTRATE OR PLEURAL EFFUSION ON THE LEFT.



TECHNICAL DOCUMENTATION:  JOB ID:  [id number]

 2011 [employer]- All Rights Reserved



Reading location - IP/workstation name: [email or web address]

## 2018-11-30 LAB
ANION GAP SERPL CALC-SCNC: 10 MMOL/L (ref 5–19)
ARTERIAL BLOOD FIO2: (no result)
ARTERIAL BLOOD H2CO3: 1.36 MMOL/L (ref 1.05–1.35)
ARTERIAL BLOOD HCO3: 29.3 MMOL/L (ref 20–24)
ARTERIAL BLOOD PCO2: 45.1 MMHG (ref 35–45)
ARTERIAL BLOOD PH: 7.43 (ref 7.35–7.45)
ARTERIAL BLOOD PO2: 77.7 MMHG (ref 80–100)
ARTERIAL BLOOD TOTAL CO2: 30.7 MMOL/L (ref 21–25)
BASE EXCESS BLDA CALC-SCNC: 4.4 MMOL/L
BUN SERPL-MCNC: 23 MG/DL (ref 7–20)
CALCIUM: 8.9 MG/DL (ref 8.4–10.2)
CHLORIDE SERPL-SCNC: 101 MMOL/L (ref 98–107)
CO2 SERPL-SCNC: 31 MMOL/L (ref 22–30)
GLUCOSE SERPL-MCNC: 168 MG/DL (ref 75–110)
POTASSIUM SERPL-SCNC: 4.6 MMOL/L (ref 3.6–5)
SAO2 % BLDA: 95.7 % (ref 94–98)
SODIUM SERPL-SCNC: 141.5 MMOL/L (ref 137–145)

## 2018-11-30 RX ADMIN — IPRATROPIUM BROMIDE AND ALBUTEROL SULFATE SCH ML: 2.5; .5 SOLUTION RESPIRATORY (INHALATION) at 08:56

## 2018-11-30 RX ADMIN — Medication SCH ML: at 13:40

## 2018-11-30 RX ADMIN — METHYLPREDNISOLONE SODIUM SUCCINATE SCH MG: 40 INJECTION, POWDER, FOR SOLUTION INTRAMUSCULAR; INTRAVENOUS at 18:03

## 2018-11-30 RX ADMIN — SODIUM CHLORIDE PRN MLS/HR: 0.45 INJECTION, SOLUTION INTRAVENOUS at 10:20

## 2018-11-30 RX ADMIN — Medication SCH: at 05:22

## 2018-11-30 RX ADMIN — DOXYCYCLINE SCH MLS/HR: 100 INJECTION, POWDER, LYOPHILIZED, FOR SOLUTION INTRAVENOUS at 10:15

## 2018-11-30 RX ADMIN — VERAPAMIL HYDROCHLORIDE SCH MG: 120 TABLET, FILM COATED, EXTENDED RELEASE ORAL at 21:56

## 2018-11-30 RX ADMIN — DOXYCYCLINE SCH MLS/HR: 100 INJECTION, POWDER, LYOPHILIZED, FOR SOLUTION INTRAVENOUS at 21:55

## 2018-11-30 RX ADMIN — TRIAMTERENE AND HYDROCHLOROTHIAZIDE SCH TAB: 75; 50 TABLET ORAL at 10:16

## 2018-11-30 RX ADMIN — IPRATROPIUM BROMIDE AND ALBUTEROL SULFATE SCH ML: 2.5; .5 SOLUTION RESPIRATORY (INHALATION) at 14:24

## 2018-11-30 RX ADMIN — METFORMIN HYDROCHLORIDE SCH MG: 500 TABLET, FILM COATED ORAL at 15:45

## 2018-11-30 RX ADMIN — SODIUM CHLORIDE PRN MLS/HR: 0.45 INJECTION, SOLUTION INTRAVENOUS at 21:59

## 2018-11-30 RX ADMIN — METHYLPREDNISOLONE SODIUM SUCCINATE SCH MG: 125 INJECTION, POWDER, FOR SOLUTION INTRAMUSCULAR; INTRAVENOUS at 02:36

## 2018-11-30 RX ADMIN — LANSOPRAZOLE SCH MG: 30 TABLET, ORALLY DISINTEGRATING, DELAYED RELEASE ORAL at 05:22

## 2018-11-30 RX ADMIN — INSULIN LISPRO PRN UNIT: 100 INJECTION, SOLUTION INTRAVENOUS; SUBCUTANEOUS at 13:41

## 2018-11-30 RX ADMIN — IPRATROPIUM BROMIDE AND ALBUTEROL SULFATE SCH ML: 2.5; .5 SOLUTION RESPIRATORY (INHALATION) at 20:50

## 2018-11-30 RX ADMIN — Medication SCH: at 21:53

## 2018-11-30 RX ADMIN — IPRATROPIUM BROMIDE AND ALBUTEROL SULFATE SCH ML: 2.5; .5 SOLUTION RESPIRATORY (INHALATION) at 02:36

## 2018-11-30 RX ADMIN — ENOXAPARIN SODIUM SCH MG: 40 INJECTION SUBCUTANEOUS at 10:17

## 2018-11-30 RX ADMIN — VERAPAMIL HYDROCHLORIDE SCH MG: 180 TABLET, FILM COATED, EXTENDED RELEASE ORAL at 21:56

## 2018-11-30 RX ADMIN — METHYLPREDNISOLONE SODIUM SUCCINATE SCH MG: 40 INJECTION, POWDER, FOR SOLUTION INTRAMUSCULAR; INTRAVENOUS at 10:16

## 2018-11-30 NOTE — PDOC PROGRESS REPORT
Subjective


Progress Note for:: 11/30/18


Subjective:: 





Patient states feeling better she is only on 1 L oxygen satting at 100% she was 

able to bring up some phlegm which was sent off for sputum's.  She denies chest 

pain, does have palpitations after the nebulizer no nausea vomiting.  She had a 

bowel movement yesterday she is afebrile.  She does state that having 3 weeks 

ago while her  was having a seizure she tried to hold brace his fall she 

has been experiencing right-sided back pain increased on standing with right 

leg weakness.  Denies paresthesias bladder or bowel dysfunction.


Reason For Visit: 


ACUTE HYPOXIC RESPIRATORY FAILURE








Physical Exam


Vital Signs: 


 











Temp Pulse Resp BP Pulse Ox


 


 97.4 F   85   20   121/53 L  94 


 


 11/30/18 03:24  11/30/18 08:56  11/30/18 08:56  11/30/18 03:24  11/30/18 08:56








 Intake & Output











 11/29/18 11/30/18 12/01/18





 06:59 06:59 06:59


 


Intake Total 250 500 


 


Output Total 0  


 


Balance 250 500 


 


Weight 213.5 kg 230.1 kg 











General appearance: PRESENT: mild distress, obese


Head exam: PRESENT: atraumatic, normocephalic


Eye exam: PRESENT: conjunctiva pink, EOMI, PERRLA.  ABSENT: scleral icterus


Ear exam: PRESENT: normal external ear exam


Mouth exam: PRESENT: moist, tongue midline


Teeth exam: PRESENT: dental caries


Neck exam: PRESENT: full ROM.  ABSENT: carotid bruit, JVD, lymphadenopathy, 

thyromegaly


Respiratory exam: PRESENT: decreased breath sounds


Cardiovascular exam: PRESENT: RRR.  ABSENT: diastolic murmur, rubs, systolic 

murmur


Pulses: PRESENT: normal dorsalis pedis pul, +2 pedal pulses bilateral


Vascular exam: PRESENT: normal capillary refill


GI/Abdominal exam: PRESENT: normal bowel sounds, soft.  ABSENT: distended, 

guarding, mass, organolmegaly, rebound, tenderness


Rectal exam: PRESENT: deferred


Extremities exam: PRESENT: pedal edema


Musculoskeletal exam: PRESENT: tenderness


Neurological exam: PRESENT: alert, awake, oriented to person, oriented to place

, oriented to time, oriented to situation, CN II-XII grossly intact.  ABSENT: 

motor sensory deficit


Psychiatric exam: PRESENT: appropriate affect, normal mood.  ABSENT: homicidal 

ideation, suicidal ideation


Skin exam: PRESENT: dry, intact, warm.  ABSENT: cyanosis, rash





Results


Laboratory Results: 


 





 11/30/18 05:28 





 











  11/29/18 11/30/18 11/30/18





  17:05 05:28 06:55


 


Carbonic Acid  1.37 H   1.36 H


 


HCO3/H2CO3 Ratio  20:1   21:1


 


ABG pH  7.40   7.43


 


ABG pCO2  45.6 H   45.1 H


 


ABG pO2  71.1 L   77.7 L


 


ABG HCO3  27.4 H   29.3 H


 


ABG O2 Saturation  94.2   95.7


 


ABG Base Excess  2.0   4.4


 


FiO2  2L   2L


 


Sodium   141.5 


 


Potassium   4.6 


 


Chloride   101 


 


Carbon Dioxide   31 H 


 


Anion Gap   10 


 


BUN   23 H 


 


Creatinine   0.76 


 


Est GFR ( Amer)   > 60 


 


Est GFR (Non-Af Amer)   > 60 


 


Glucose   168 H 


 


Calcium   8.9 








 











  11/28/18 11/28/18





  07:15 13:08


 


Troponin I  < 0.012  < 0.012











Impressions: 


 





Soft Tissue Neck X-Ray  11/28/18 00:35


IMPRESSION:


 


 


1. No definite acute abnormality in the neck soft tissues by


plain film criteria.


 


 


copyright 2011 [employer]- All Rights Reserved


 








Chest X-Ray  11/29/18 00:00


IMPRESSION:  LIMITED STUDY.  CANNOT EXCLUDE INFILTRATE OR PLEURAL EFFUSION ON 

THE LEFT.


 














Assessment & Plan





- Diagnosis


(1) Acute respiratory failure with hypoxia


Is this a current diagnosis for this admission?: Yes   


Plan: 


And await sputum cultures, continue supplemental oxygen, nebulizers we will 

decrease Medrol to 40 mg IV every 8.








(2) DVT prophylaxis


Is this a current diagnosis for this admission?: Yes   


Plan: 


Can you low molecular weight heparin.








(3) Diabetes mellitus type 2 in obese


Is this a current diagnosis for this admission?: Yes   


Plan: 


Now that her respiratory status has shown an improvement we will start her on a 

carb free diet and start metformin 500 twice daily continue Accu-Cheks with 

sliding scale.








(4) Dyspnea


Qualifiers: 


   Dyspnea type: unspecified   Qualified Code(s): R06.00 - Dyspnea, unspecified

   


Is this a current diagnosis for this admission?: Yes   





(5) Hypertension


Qualifiers: 


   Hypertension type: essential hypertension   Qualified Code(s): I10 - 

Essential (primary) hypertension   


Is this a current diagnosis for this admission?: Yes   


Plan: 


Patient's verapamil and hydrochlorothiazide were resumed.








(6) Morbid obesity


Is this a current diagnosis for this admission?: Yes   





- Time


Time Spent with patient: 25-34 minutes


Medications reviewed and adjusted accordingly: Yes


Anticipated discharge: Home





- Inpatient Certification


Based on my medical assessment, after consideration of the patient's 

comorbidities, presenting symptoms, or acuity I expect that the services needed 

warrant INPATIENT care.: Yes


I certify that my determination is in accordance with my understanding of 

Medicare's requirements for reasonable and necessary INPATIENT services [42 CFR 

412.3e].: Yes


Medical Necessity: Failure to Improve With Outpatient Therapy, Significant 

Comorbidiites Make Outpatient Treatment Too Risky, Need For IV Fluids

## 2018-11-30 NOTE — PDOC CONSULTATION
Consultation


Consult Date: 11/30/18


Attending physician:: YOHANNES GARDNER


Consult reason:: Dyspnea





History of Present Illness


Admission Date/PCP: 


  11/28/18 03:26





  DAVE AHRPER MD





History of Present Illness: 


LILIA HAMILTON is a 63 year old female complains of acute shortness of breath 

and hoarseness this happened in the past has been started approximately 3-4 

days ago she knew she was not feeling well but she was recently had a CVA she 

says the cough is usually dry she denies any hemoptysis PPD status is unknown 

no history chronic lung disease as a child or adolescent.  Admits to passive 

smoke as a child as well as an adult no significant exposure to personal 

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 

creatinine 1 dog and travel denies angina-like chest pain sleeps on 3 pillows 

occasional PND occasional nocturnal cough chronic edema she admits to snoring 

restless sleep nocturia 3-4 times per night unrestful sleep and excessive 

daytime somnolence.








Past Medical History


Cardiac Medical History: Reports: Hypertension - medicated, Other - Arrhythmia, 

unknown which one Morbid obesity


   Denies: Myocardial Infarction


Pulmonary Medical History: Reports: Asthma


Neurological Medical History: 


   Denies: Seizures


Endocrine Medical History: Reports: Diabetes Mellitus Type 2


GI Medical History: 


   Denies: Hepatitis, Hiatal Hernia


Hematology: 


   Denies: Anemia, Sickle Cell Disease





Past Surgical History


Past Surgical History: Reports: Cholecystectomy


   Denies: Amputation, Hysterectomy, Mastectomy, Pacemaker





Social History


Information Source: Patient, [facility]


Smoking Status: Former Smoker


Last Time Smoked: 40 years ago


Passive smoke exposure as: Both


Frequency of Alcohol Use: None


Hx Recreational Drug Use: No


Hx Prescription Drug Abuse: No


Do you have pets?: Yes


Have you had any respiratory illnesses as a child?: No


Have you been exposed to any sick contacts recently?: No


Have you had any recent respiratory illnesses?: No


Have you travelled outside of [place] in the past 12 months?: No





- Advance Directive


Resuscitation Status: Full Code





Family History


Family History: Reviewed & Not Pertinent, COPD, CVA, DM, Hyperlipidemia, 

Hypertension, Malignancy


Parental Family History Reviewed: Yes


Children Family History Reviewed: Yes


Sibling(s) Family History Reviewed.: Yes





Medication/Allergy


Home Medications: 








Cyclobenzaprine HCl [Flexeril 10 mg Tablet] 10 mg PO TID 04/29/14 


Triamterene/Hydrochlorothiazid [Triamterene-Hctz 75-50 mg Tab] 1 tab PO DAILY 04 /29/14 


Verapamil HCl [Verapamil ER Pm] 300 mg PO QHS 04/29/14 


Acetaminophen [Tylenol Extra Strength 500 mg Tablet] 1 tab PO Q6HP PRN 11/28/18 


Cetirizine HCl [Zyrtec 10 mg Tablet] 1 tab PO DAILY 11/28/18 


Clotrimazole [Itch Relief] 1 applic TP BID 11/28/18 


Fluticasone Propionate [Flonase Nasal Spray 50 Mcg/Spray 16 gm] 1 spray NASL 

DAILY 11/28/18 


Metformin HCl [Metformin HCl ER] 500 mg PO QPM 11/28/18 








Allergies/Adverse Reactions: 


 





No Known Allergies Allergy (Unverified 04/29/14 11:24)


 











Review of Systems


Constitutional: PRESENT: chills, fatigue, fever(s), weakness


Eyes: ABSENT: visual disturbances


Ears: ABSENT: hearing changes


Nose, Mouth, and Throat: ABSENT: mouth pain


Cardiovascular: PRESENT: dyspnea on exertion, edema, orthropnea.  ABSENT: 

palpitations


Respiratory: PRESENT: cough, dyspnea.  ABSENT: hemoptysis


Gastrointestinal: PRESENT: diarrhea.  ABSENT: abdominal pain, bloating, coffee 

ground emesis, heartburn, hematemesis, hematochezia, melena, nausea


Genitourinary: ABSENT: difficulty urinating, dysuria, hematuria


Musculoskeletal: ABSENT: deformity, joint swelling


Integumentary: ABSENT: pruritus, rash


Neurological: ABSENT: abnormal gait, abnormal movements, abnormal speech, 

confusion, focal weakness, frequent falls, lack of coordination, memory loss


Psychiatric: ABSENT: hallucinations, homidical ideation, suicidal ideation


Endocrine: ABSENT: cold intolerance, heat intolerance, polydipsia, polyphagia


Hematologic/Lymphatic: ABSENT: easy bruising, lymphadenopathy


Allergic/Immunologic: ABSENT: seasonal rhinorrhea





Physical Exam


Vital Signs: 


 











Temp Pulse Resp BP Pulse Ox


 


 97.4 F   85   20   121/53 L  94 


 


 11/30/18 03:24  11/30/18 08:56  11/30/18 08:56  11/30/18 03:24  11/30/18 08:56








 Intake & Output











 11/29/18 11/30/18 12/01/18





 06:59 06:59 06:59


 


Intake Total 250 500 


 


Output Total 0  


 


Balance 250 500 


 


Weight 213.5 kg 230.1 kg 











General appearance: PRESENT: no acute distress, cooperative, morbidly obese.  

ABSENT: disheveled


Head exam: PRESENT: atraumatic, normocephalic


Eye exam: PRESENT: conjunctiva pale, EOMI.  ABSENT: nystagmus, periorbital 

swelling


Mouth exam: PRESENT: dry mucosa, neck supple, tongue midline


Neck exam: ABSENT: carotid bruit, JVD, lymphadenopathy, thyromegaly, tracheal 

deviation, tracheostomy


Respiratory exam: PRESENT: decreased breath sounds, prolonged expiratory phas, 

rales, rhonchi, symmetrical, unlabored, wheezes.  ABSENT: tachypnea


Cardiovascular exam: PRESENT: RRR, +S1, +S2


Pulses: PRESENT: normal radial pulses


GI/Abdominal exam: PRESENT: soft.  ABSENT: tenderness


Extremities exam: PRESENT: pedal edema.  ABSENT: calf tenderness, clubbing, 

joint swelling


Neurological exam: PRESENT: alert, awake


Skin exam: PRESENT: dry, warm





Results


Laboratory Results: 


 





 11/30/18 05:28 





 











  11/29/18 11/30/18 11/30/18





  17:05 05:28 06:55


 


Carbonic Acid  1.37 H   1.36 H


 


HCO3/H2CO3 Ratio  20:1   21:1


 


ABG pH  7.40   7.43


 


ABG pCO2  45.6 H   45.1 H


 


ABG pO2  71.1 L   77.7 L


 


ABG HCO3  27.4 H   29.3 H


 


ABG O2 Saturation  94.2   95.7


 


ABG Base Excess  2.0   4.4


 


FiO2  2L   2L


 


Sodium   141.5 


 


Potassium   4.6 


 


Chloride   101 


 


Carbon Dioxide   31 H 


 


Anion Gap   10 


 


BUN   23 H 


 


Creatinine   0.76 


 


Est GFR ( Amer)   > 60 


 


Est GFR (Non-Af Amer)   > 60 


 


Glucose   168 H 


 


Calcium   8.9 








 











  11/28/18 11/28/18





  07:15 13:08


 


Troponin I  < 0.012  < 0.012











Impressions: 


 





Soft Tissue Neck X-Ray  11/28/18 00:35


IMPRESSION:


 


 


1. No definite acute abnormality in the neck soft tissues by


plain film criteria.


 


 


copyright 2011 [employer]- All Rights Reserved


 








Chest X-Ray  11/29/18 00:00


IMPRESSION:  LIMITED STUDY.  CANNOT EXCLUDE INFILTRATE OR PLEURAL EFFUSION ON 

THE LEFT.


 














Assessment & Plan





- Diagnosis


(1) Acute respiratory failure with hypoxia


Is this a current diagnosis for this admission?: Yes   


Plan: 


Supplemental oxygen NIPPV continue current bronchodilator therapy








(2) Obesity with body mass index greater than 30


Is this a current diagnosis for this admission?: Yes   


Plan: 


Dietary consult weight loss

## 2018-11-30 NOTE — XCELERA REPORT
[address] [address]

                               Tel: [phone]

                               Fax: [phone]



                      Transthoracic Echocardiogram Report

_______________________________________________________________________________



Name: LILIA HAMILTON

MRN: [id number]                           Age: 63 yrs

Gender: Female                            : 1955

Patient Status: Inpatient                 Patient Location: [address] [place] [address]

Account #: [id number]

Study Date: 2018 05:28 PM

Accession #: [id number]

_______________________________________________________________________________



Height: 66 in        Weight: 470 lb        BSA: 2.9 m2

_______________________________________________________________________________

Procedure: A two-dimensional transthoracic echocardiogram with color flow and

Doppler was performed. Study Quality: Poor. The study was technically limited

with all images being suboptimal in quality. Images were not obtained from all

of the standard acoustic windows due to the limited scope of the study.

Reason For Study: sob



History: Shortness of breath.

Ordering Physician: YOHANNES GARDNER



Performed By: Kaylyn Costa

_______________________________________________________________________________



Interpretation Summary

Cannot assess LV chamber size or wall thickess.The posterior wall and the

anteroseptum contract normall.Rest of LV walls are not visualisedIn he one

view LVEF is > than 60%.No S , and no AR.No oter interpretation possible due

topoor images

Cannot assess LV chamber size or wall thickess.The posterior wall and the

anteroseptum contract normall.Rest of LV walls are not visualisedIn he one

view LVEF is > than 60%.No S , and no AR.No oter interpretation possible due

topoor images..Recommend First pass MUGA for RVEF and LVEF.



MMode/2D Measurements & Calculations

RVDd: 2.9 cm  LVIDd: 5.6 cm   FS: 29.1 %             Ao root diam: 2.4 cm



IVSd: 1.1 cm  LVIDs: 4.0 cm   EDV(Teich): 153.6 ml   Ao root area: 4.7 cm2

              LVPWd: 1.1 cm   ESV(Teich): 68.7 ml    LA dimension: 4.6 cm

                              EF(Teich): 55.3 %



Doppler Measurements & Calculations

MV E max samir:      MV P1/2t max samir:     Ao V2 max:         LV V1 max P.6 cm/sec       129.4 cm/sec          114.7 cm/sec       4.0 mmHg

                   MV P1/2t: 49.5 msec   Ao max P.3 mmHgLV V1 max:

                   MVA(P1/2t): 4.4 cm2                      100.6 cm/sec

                   MV dec slope:



                   765.9 cm/sec2

                   MV dec time: 0.11 sec

        _______________________________________________________________

PA V2 max:         TR max samir:           MV P1/2t-pr_phl:

137.1 cm/sec       194.0 cm/sec          49.5 msec

PA max P.5 mmHgTR max PG: 15.1 mmHg





Left Ventricle

Cannot assess LV chamber size or wall thickess.The posterior wall and the

anteroseptum contract normall.Rest of LV walls are not visualisedIn he one

view LVEF is > than 60%.No S , and no AR.No oter interpretation possible due

topoor images..Recommend First pass MUGA for RVEF and LVEF.



_______________________________________________________________________________



_______________________________________________________________________________

Electronically signed by:      Rosa Quinn      on 2018 03:42 PM



CC: YOHANNES GARDNER

>

Rosa Quinn

## 2018-12-01 LAB
ADD MANUAL DIFF: NO
ANION GAP SERPL CALC-SCNC: 12 MMOL/L (ref 5–19)
BASOPHILS # BLD AUTO: 0 10^3/UL (ref 0–0.2)
BASOPHILS NFR BLD AUTO: 0.1 % (ref 0–2)
BUN SERPL-MCNC: 28 MG/DL (ref 7–20)
CALCIUM: 8.8 MG/DL (ref 8.4–10.2)
CHLORIDE SERPL-SCNC: 99 MMOL/L (ref 98–107)
CO2 SERPL-SCNC: 28 MMOL/L (ref 22–30)
EOSINOPHIL # BLD AUTO: 0 10^3/UL (ref 0–0.6)
EOSINOPHIL NFR BLD AUTO: 0.1 % (ref 0–6)
ERYTHROCYTE [DISTWIDTH] IN BLOOD BY AUTOMATED COUNT: 15.1 % (ref 11.5–14)
GLUCOSE SERPL-MCNC: 191 MG/DL (ref 75–110)
HCT VFR BLD CALC: 38.5 % (ref 36–47)
HGB BLD-MCNC: 13 G/DL (ref 12–15.5)
LYMPHOCYTES # BLD AUTO: 0.3 10^3/UL (ref 0.5–4.7)
LYMPHOCYTES NFR BLD AUTO: 5.1 % (ref 13–45)
MCH RBC QN AUTO: 29 PG (ref 27–33.4)
MCHC RBC AUTO-ENTMCNC: 33.8 G/DL (ref 32–36)
MCV RBC AUTO: 86 FL (ref 80–97)
MONOCYTES # BLD AUTO: 0.2 10^3/UL (ref 0.1–1.4)
MONOCYTES NFR BLD AUTO: 3.4 % (ref 3–13)
NEUTROPHILS # BLD AUTO: 6 10^3/UL (ref 1.7–8.2)
NEUTS SEG NFR BLD AUTO: 91.3 % (ref 42–78)
PLATELET # BLD: 235 10^3/UL (ref 150–450)
POTASSIUM SERPL-SCNC: 4.2 MMOL/L (ref 3.6–5)
RBC # BLD AUTO: 4.5 10^6/UL (ref 3.72–5.28)
SODIUM SERPL-SCNC: 139.1 MMOL/L (ref 137–145)
TOTAL CELLS COUNTED % (AUTO): 100 %
WBC # BLD AUTO: 6.5 10^3/UL (ref 4–10.5)

## 2018-12-01 RX ADMIN — METHYLPREDNISOLONE SODIUM SUCCINATE SCH MG: 40 INJECTION, POWDER, FOR SOLUTION INTRAMUSCULAR; INTRAVENOUS at 11:54

## 2018-12-01 RX ADMIN — METHYLPREDNISOLONE SODIUM SUCCINATE SCH MG: 40 INJECTION, POWDER, FOR SOLUTION INTRAMUSCULAR; INTRAVENOUS at 18:50

## 2018-12-01 RX ADMIN — LANSOPRAZOLE SCH MG: 30 TABLET, ORALLY DISINTEGRATING, DELAYED RELEASE ORAL at 05:26

## 2018-12-01 RX ADMIN — PANTOPRAZOLE SODIUM SCH: 40 INJECTION, POWDER, FOR SOLUTION INTRAVENOUS at 22:26

## 2018-12-01 RX ADMIN — VERAPAMIL HYDROCHLORIDE SCH MG: 180 TABLET, FILM COATED, EXTENDED RELEASE ORAL at 22:24

## 2018-12-01 RX ADMIN — METFORMIN HYDROCHLORIDE SCH MG: 500 TABLET, FILM COATED ORAL at 18:50

## 2018-12-01 RX ADMIN — METFORMIN HYDROCHLORIDE SCH MG: 500 TABLET, FILM COATED ORAL at 09:05

## 2018-12-01 RX ADMIN — IPRATROPIUM BROMIDE AND ALBUTEROL SULFATE SCH ML: 2.5; .5 SOLUTION RESPIRATORY (INHALATION) at 14:19

## 2018-12-01 RX ADMIN — METHYLPREDNISOLONE SODIUM SUCCINATE SCH MG: 40 INJECTION, POWDER, FOR SOLUTION INTRAMUSCULAR; INTRAVENOUS at 01:23

## 2018-12-01 RX ADMIN — Medication SCH ML: at 15:42

## 2018-12-01 RX ADMIN — DOXYCYCLINE SCH MLS/HR: 100 INJECTION, POWDER, LYOPHILIZED, FOR SOLUTION INTRAVENOUS at 11:53

## 2018-12-01 RX ADMIN — Medication SCH: at 05:21

## 2018-12-01 RX ADMIN — PANTOPRAZOLE SODIUM SCH MG: 40 INJECTION, POWDER, FOR SOLUTION INTRAVENOUS at 18:49

## 2018-12-01 RX ADMIN — GUAIFENESIN SCH MG: 600 TABLET, EXTENDED RELEASE ORAL at 22:24

## 2018-12-01 RX ADMIN — Medication SCH: at 22:35

## 2018-12-01 RX ADMIN — ENOXAPARIN SODIUM SCH MG: 40 INJECTION SUBCUTANEOUS at 11:52

## 2018-12-01 RX ADMIN — TRIAMTERENE AND HYDROCHLOROTHIAZIDE SCH TAB: 75; 50 TABLET ORAL at 11:52

## 2018-12-01 RX ADMIN — IPRATROPIUM BROMIDE AND ALBUTEROL SULFATE SCH ML: 2.5; .5 SOLUTION RESPIRATORY (INHALATION) at 08:50

## 2018-12-01 RX ADMIN — VERAPAMIL HYDROCHLORIDE SCH MG: 120 TABLET, FILM COATED, EXTENDED RELEASE ORAL at 22:24

## 2018-12-01 RX ADMIN — IPRATROPIUM BROMIDE AND ALBUTEROL SULFATE SCH ML: 2.5; .5 SOLUTION RESPIRATORY (INHALATION) at 02:58

## 2018-12-01 NOTE — PROGRESS NOTE E
Progress Note



NAME: LILIA HAMILTON

MRN: [id number]

: 1955             AGE: 63Y

DATE:  2018           ROOM: 334



SUBJECTIVE:

The patient is lying in bed.  She states she is having terrible reflux

today.  The patient denies any nausea, vomiting, but states that she is

having intense heartburn, especially after a meal.  The patient also

states that she feels she is congested and just needs help coughing up

some sputum.  The patient has been afebrile, her blood pressure has been

in a good range, and the patient did not voice any other concerns at this

time.



BRIEF HISTORY:

The patient is a 63-year-old  female with a past medical history

of obesity hypoventilation syndrome, her BMI is 81.5.  The patient, who is

a patient of Dr. Hastings, is being followed by the hospitalist this

weekend.  The patient presented to the emergency department with a chief

complaint of difficulties breathing.  The patient did require BiPAP and

was admitted.



REVIEW OF SYSTEMS:

Rest of review of systems negative.



MEDICATIONS:

Have been reviewed.



OBJECTIVE:

GENERAL:  The patient is a 63-year-old  female who is awake,

alert.  She is oriented to person, time, place, situation.  She is verbal,

conversational.  She does not appear to be in any acute distress.



VITAL SIGNS:  Temperature is 98.4, pulse 85, respirations 16, blood

pressure is 115/61, oxygen saturation is 93% on 1 liter nasal cannula.



SKIN:  Pale, dry.  No rash.  She is not diaphoretic.



HEENT:  Pupils are reactive.  Conjunctivae are pink.  There is no evidence

of JVP.



CARDIOVASCULAR:  Heart is regular.



CHEST:  Difficult to appreciate given her body habitus but appears to be

mostly clear, symmetrical, does not appeared labored.



ABDOMEN:  Obese.



EXTREMITIES:  The patient does have chronic lymphedema.



DIAGNOSTICS:

Lab values are as follows - Hematology obtained on 2018; WBC 6.5,

hemoglobin 13.0, hematocrit is 38.5, platelet count is 235,000.



Chemistry obtained on 2018; sodium is 139, potassium 4.2, chloride

is 99, carbon dioxide 28, BUN 28, creatinine is 1, glucose 191, calcium is

8.8.



IMPRESSION AND PLAN:

1.  ACUTE MOST LIKELY ON CHRONIC HYPOXEMIC RESPIRATORY FAILURE.  Sputum

culture is still pending.  Continue supplemental O2, nebulizers, as well

as Solu-Medrol.  Continue BiPAP.

2.  MORBID OBESITY WITH A BMI GREATER THAN 80.  The patient has been

counseled.

3.  HYPERTENSION.  Continue the patient's home medication.

4.  GASTROESOPHAGEAL REFLUX DISEASE.  The patient most likely has some

esophagitis.  Will discontinue doxycycline as this is probably the culprit

in addition to her body habitus.  Will cover with H2 receptor blocker as

well as PPI therapy staggered to aggressively turn this around.  If this

does not help will give her a GI cocktail.

5.  DIABETES MELLITUS TYPE 2.  Continue sliding scale coverage and

diabetic diet.



CODE STATUS:

The patient is a full code.



DISPOSITION:

Depending on the patient's symptomatology and diagnostic findings will be

evaluated in the a.m.



TIME SPENT:

On this follow up, including assessment and plan, physical examination,

patient education, family meeting, review of records was 25 minutes.







DICTATING PHYSICIAN:  OSITO SOLANO NP





[id number]                  DT: 2018    1733

PHY#: [id number]            DD: 2018    1721

ID:   [id number]           JOB#: [id number]       ACCT: [id number]



cc:

>

## 2018-12-02 LAB
ANION GAP SERPL CALC-SCNC: 12 MMOL/L (ref 5–19)
BUN SERPL-MCNC: 32 MG/DL (ref 7–20)
CALCIUM: 8.9 MG/DL (ref 8.4–10.2)
CHLORIDE SERPL-SCNC: 99 MMOL/L (ref 98–107)
CO2 SERPL-SCNC: 28 MMOL/L (ref 22–30)
GLUCOSE SERPL-MCNC: 195 MG/DL (ref 75–110)
POTASSIUM SERPL-SCNC: 4.8 MMOL/L (ref 3.6–5)
SODIUM SERPL-SCNC: 138.7 MMOL/L (ref 137–145)

## 2018-12-02 RX ADMIN — METFORMIN HYDROCHLORIDE SCH MG: 500 TABLET, FILM COATED ORAL at 16:37

## 2018-12-02 RX ADMIN — METHYLPREDNISOLONE SODIUM SUCCINATE SCH MG: 40 INJECTION, POWDER, FOR SOLUTION INTRAMUSCULAR; INTRAVENOUS at 10:36

## 2018-12-02 RX ADMIN — INSULIN LISPRO PRN UNIT: 100 INJECTION, SOLUTION INTRAVENOUS; SUBCUTANEOUS at 09:25

## 2018-12-02 RX ADMIN — METFORMIN HYDROCHLORIDE SCH MG: 500 TABLET, FILM COATED ORAL at 08:23

## 2018-12-02 RX ADMIN — ENOXAPARIN SODIUM SCH MG: 40 INJECTION SUBCUTANEOUS at 10:38

## 2018-12-02 RX ADMIN — TRIAMTERENE AND HYDROCHLOROTHIAZIDE SCH TAB: 75; 50 TABLET ORAL at 10:36

## 2018-12-02 RX ADMIN — Medication SCH: at 05:27

## 2018-12-02 RX ADMIN — VERAPAMIL HYDROCHLORIDE SCH MG: 180 TABLET, FILM COATED, EXTENDED RELEASE ORAL at 22:59

## 2018-12-02 RX ADMIN — FAMOTIDINE SCH MG: 10 INJECTION INTRAVENOUS at 22:58

## 2018-12-02 RX ADMIN — Medication SCH ML: at 22:59

## 2018-12-02 RX ADMIN — FAMOTIDINE SCH MG: 10 INJECTION INTRAVENOUS at 10:37

## 2018-12-02 RX ADMIN — GUAIFENESIN SCH MG: 600 TABLET, EXTENDED RELEASE ORAL at 10:36

## 2018-12-02 RX ADMIN — FAMOTIDINE SCH MG: 10 INJECTION INTRAVENOUS at 01:28

## 2018-12-02 RX ADMIN — PANTOPRAZOLE SODIUM SCH MG: 40 INJECTION, POWDER, FOR SOLUTION INTRAVENOUS at 22:57

## 2018-12-02 RX ADMIN — Medication SCH ML: at 16:36

## 2018-12-02 RX ADMIN — VERAPAMIL HYDROCHLORIDE SCH MG: 120 TABLET, FILM COATED, EXTENDED RELEASE ORAL at 22:59

## 2018-12-02 RX ADMIN — PANTOPRAZOLE SODIUM SCH MG: 40 INJECTION, POWDER, FOR SOLUTION INTRAVENOUS at 10:34

## 2018-12-02 RX ADMIN — METHYLPREDNISOLONE SODIUM SUCCINATE SCH MG: 40 INJECTION, POWDER, FOR SOLUTION INTRAMUSCULAR; INTRAVENOUS at 01:28

## 2018-12-02 RX ADMIN — GUAIFENESIN SCH MG: 600 TABLET, EXTENDED RELEASE ORAL at 22:57

## 2018-12-02 NOTE — PDOC PROGRESS REPORT
Subjective


Progress Note for:: 12/02/18


Subjective:: 





No adverse events overnight.  No new complaints.  She feels like overall her 

breathing is better.  She still has a cough.  It is nonproductive.  She has 

been afebrile.


Reason For Visit: 


ACUTE HYPOXIC RESPIRATORY FAILURE








Physical Exam


Vital Signs: 


 











Temp Pulse Resp BP Pulse Ox


 


 98.1 F   89   22 H  128/58 H  99 


 


 12/02/18 12:00  12/02/18 12:00  12/02/18 12:00  12/02/18 12:00  12/02/18 12:00








 Intake & Output











 12/01/18 12/02/18 12/03/18





 06:59 06:59 06:59


 


Intake Total 3180 3839 


 


Output Total 1800 3850 


 


Balance 1380 -11 


 


Weight 229 kg 228.3 kg 











General appearance: PRESENT: no acute distress, cooperative, disheveled, 

morbidly obese


Respiratory exam: PRESENT: decreased breath sounds, symmetrical, unlabored.  

ABSENT: chest wall tenderness, rales, rhonchi, tachypnea, wheezes


Cardiovascular exam: PRESENT: RRR, +S1, +S2


GI/Abdominal exam: PRESENT: normal bowel sounds, soft.  ABSENT: guarding, 

rebound, tenderness


Extremities exam: PRESENT: pedal edema.  ABSENT: clubbing


Musculoskeletal exam: ABSENT: deformity


Neurological exam: PRESENT: alert, awake, oriented to person, oriented to place

, oriented to time, oriented to situation





Results


Laboratory Results: 


 





 12/01/18 06:49 





 12/02/18 06:28 





 











  12/02/18





  06:28


 


Sodium  138.7


 


Potassium  4.8


 


Chloride  99


 


Carbon Dioxide  28


 


Anion Gap  12


 


BUN  32 H


 


Creatinine  1.00


 


Est GFR ( Amer)  > 60


 


Est GFR (Non-Af Amer)  56 L


 


Glucose  195 H


 


Calcium  8.9


 


Magnesium  2.4 H








 





11/29/18 16:10   Sputum   Gram Stain - Final


11/29/18 16:10   Sputum   Sputum Culture - Final


                            Staphylococcus Aureus


                            Reduced Normal Tressa





 











  11/28/18 11/28/18





  07:15 13:08


 


Troponin I  < 0.012  < 0.012











Impressions: 


 





Soft Tissue Neck X-Ray  11/28/18 00:35


IMPRESSION:


 


 


1. No definite acute abnormality in the neck soft tissues by


plain film criteria.


 


 


copyright 2011 [employer]- All Rights Reserved


 








Chest X-Ray  11/29/18 00:00


IMPRESSION:  LIMITED STUDY.  CANNOT EXCLUDE INFILTRATE OR PLEURAL EFFUSION ON 

THE LEFT.


 














Assessment & Plan





- Diagnosis


(1) Acute respiratory failure with hypoxia


Is this a current diagnosis for this admission?: Yes   


Plan: 


Improved with current therapy.  We will continue to wean her oxygen as 

tolerated.








(2) Bronchial asthma


Qualifiers: 


   Asthma severity: unspecified severity   Asthma persistence: unspecified   

Asthma complication type: with acute exacerbation   Qualified Code(s): J45.901 

- Unspecified asthma with (acute) exacerbation   


Is this a current diagnosis for this admission?: Yes   


Plan: 


I am de-escalating her steroids.  We will continue with her other treatments.








(3) Morbid obesity with BMI of 70 and over, adult


Is this a current diagnosis for this admission?: Yes   


Plan: 


Strongly encouraged lifestyle modification.








- Time


Time Spent with patient: 25-34 minutes

## 2018-12-03 RX ADMIN — FAMOTIDINE SCH: 10 INJECTION INTRAVENOUS at 09:20

## 2018-12-03 RX ADMIN — GUAIFENESIN SCH MG: 600 TABLET, EXTENDED RELEASE ORAL at 21:46

## 2018-12-03 RX ADMIN — VERAPAMIL HYDROCHLORIDE SCH MG: 180 TABLET, FILM COATED, EXTENDED RELEASE ORAL at 21:46

## 2018-12-03 RX ADMIN — Medication SCH: at 13:56

## 2018-12-03 RX ADMIN — LANSOPRAZOLE SCH MG: 30 TABLET, ORALLY DISINTEGRATING, DELAYED RELEASE ORAL at 17:32

## 2018-12-03 RX ADMIN — PANTOPRAZOLE SODIUM SCH: 40 INJECTION, POWDER, FOR SOLUTION INTRAVENOUS at 09:20

## 2018-12-03 RX ADMIN — Medication SCH: at 05:39

## 2018-12-03 RX ADMIN — ENOXAPARIN SODIUM SCH MG: 40 INJECTION SUBCUTANEOUS at 09:19

## 2018-12-03 RX ADMIN — INSULIN LISPRO PRN UNIT: 100 INJECTION, SOLUTION INTRAVENOUS; SUBCUTANEOUS at 17:32

## 2018-12-03 RX ADMIN — LEVOFLOXACIN SCH MG: 750 TABLET, FILM COATED ORAL at 09:18

## 2018-12-03 RX ADMIN — METFORMIN HYDROCHLORIDE SCH MG: 500 TABLET, FILM COATED ORAL at 09:19

## 2018-12-03 RX ADMIN — TRIAMTERENE AND HYDROCHLOROTHIAZIDE SCH TAB: 75; 50 TABLET ORAL at 09:19

## 2018-12-03 RX ADMIN — Medication SCH ML: at 21:46

## 2018-12-03 RX ADMIN — METFORMIN HYDROCHLORIDE SCH MG: 500 TABLET, FILM COATED ORAL at 17:31

## 2018-12-03 RX ADMIN — VERAPAMIL HYDROCHLORIDE SCH MG: 120 TABLET, FILM COATED, EXTENDED RELEASE ORAL at 21:47

## 2018-12-03 RX ADMIN — GUAIFENESIN SCH MG: 600 TABLET, EXTENDED RELEASE ORAL at 09:18

## 2018-12-03 NOTE — PDOC PROGRESS REPORT
Subjective


Progress Note for:: 12/03/18


Subjective:: 





Patient states her breathing is much better she has had no wheezing they tried 

last night.  Of time off oxygen and her saturation dropped to 90 she was 

started on 1 L again she has had no further coughing the rest of her evening 

was pretty uneventful.  She is still not been seen by physical therapy or out 

of bed to chair they are awaiting physical therapy for evaluation.


Reason For Visit: 


ACUTE HYPOXIC RESPIRATORY FAILURE








Physical Exam


Vital Signs: 


 











Temp Pulse Resp BP Pulse Ox


 


 98.3 F   87   16   123/53 L  97 


 


 12/03/18 08:00  12/03/18 10:13  12/03/18 10:13  12/03/18 08:00  12/03/18 10:13








 Intake & Output











 12/02/18 12/03/18 12/04/18





 06:59 06:59 06:59


 


Intake Total 3839 1654 


 


Output Total 3850 7605 


 


Balance -11 -871 


 


Weight 228.3 kg 229 kg 











General appearance: PRESENT: no acute distress, well-developed, well-nourished


Head exam: PRESENT: atraumatic, normocephalic


Eye exam: PRESENT: conjunctiva pink, EOMI, PERRLA.  ABSENT: scleral icterus


Ear exam: PRESENT: normal external ear exam


Mouth exam: PRESENT: moist, tongue midline


Neck exam: PRESENT: full ROM.  ABSENT: carotid bruit, JVD, lymphadenopathy, 

thyromegaly


Respiratory exam: PRESENT: decreased breath sounds


Cardiovascular exam: PRESENT: RRR.  ABSENT: diastolic murmur, rubs, systolic 

murmur


Pulses: PRESENT: normal dorsalis pedis pul, +2 pedal pulses bilateral


Vascular exam: PRESENT: normal capillary refill


GI/Abdominal exam: PRESENT: normal bowel sounds, soft.  ABSENT: distended, 

guarding, mass, organolmegaly, rebound, tenderness


Rectal exam: PRESENT: deferred


Extremities exam: PRESENT: pedal edema


Musculoskeletal exam: PRESENT: other


Neurological exam: PRESENT: alert, awake, oriented to person, oriented to place

, oriented to time, oriented to situation, CN II-XII grossly intact.  ABSENT: 

motor sensory deficit


Psychiatric exam: PRESENT: appropriate affect, normal mood.  ABSENT: homicidal 

ideation, suicidal ideation


Skin exam: PRESENT: dry, intact, warm.  ABSENT: cyanosis, rash





Results


Laboratory Results: 


 





 12/01/18 06:49 





 12/02/18 06:28 





 





11/29/18 16:10   Sputum   Gram Stain - Final


11/29/18 16:10   Sputum   Sputum Culture - Final


                            Staphylococcus Aureus


                            Reduced Normal Tressa





 











  11/28/18 11/28/18





  07:15 13:08


 


Troponin I  < 0.012  < 0.012











Impressions: 


 





Soft Tissue Neck X-Ray  11/28/18 00:35


IMPRESSION:


 


 


1. No definite acute abnormality in the neck soft tissues by


plain film criteria.


 


 


copyright 2011 [employer]- All Rights Reserved


 








Chest X-Ray  11/29/18 00:00


IMPRESSION:  LIMITED STUDY.  CANNOT EXCLUDE INFILTRATE OR PLEURAL EFFUSION ON 

THE LEFT.


 














Assessment & Plan





- Diagnosis


(1) Acute respiratory failure with hypoxia


Is this a current diagnosis for this admission?: Yes   


Plan: 


Plan continue beta-2 agonist, supplemental oxygen and attempt to discontinue 

oxygen we will decrease steroids.








(2) DVT prophylaxis


Is this a current diagnosis for this admission?: Yes   





(3) Diabetes mellitus type 2 in obese


Is this a current diagnosis for this admission?: Yes   


Plan: 


Continue metformin 500 twice daily with Accu-Cheks sliding scale her blood 

sugars are less than 180 on this regimen and will be acceptable when she goes 

home.








(4) Dyspnea


Qualifiers: 


   Dyspnea type: unspecified   Qualified Code(s): R06.00 - Dyspnea, unspecified

   


Is this a current diagnosis for this admission?: Yes   





(5) Hypertension


Qualifiers: 


   Hypertension type: essential hypertension   Qualified Code(s): I10 - 

Essential (primary) hypertension   


Is this a current diagnosis for this admission?: Yes   


Plan: 


Patient's blood pressure has been controlled on the Angelique and the 

hydrochlorothiazide will continue the current regimen.








(6) Morbid obesity


Is this a current diagnosis for this admission?: Yes   


Plan: 


Dietary consultation for weight reduction.








- Time


Time Spent with patient: 15-24 minutes


Medications reviewed and adjusted accordingly: Yes


Anticipated discharge: Home


Within: within 24 hours





- Inpatient Certification


I certify that my determination is in accordance with my understanding of 

Medicare's requirements for reasonable and necessary INPATIENT services [42 CFR 

412.3e].: Yes


Medical Necessity: Failure to Improve With Outpatient Therapy, Significant 

Comorbidiites Make Outpatient Treatment Too Risky


Post Hospital Care: D/C Planner Documentation

## 2018-12-04 VITALS — DIASTOLIC BLOOD PRESSURE: 68 MMHG | SYSTOLIC BLOOD PRESSURE: 131 MMHG

## 2018-12-04 RX ADMIN — Medication SCH: at 15:57

## 2018-12-04 RX ADMIN — LEVOFLOXACIN SCH MG: 750 TABLET, FILM COATED ORAL at 09:56

## 2018-12-04 RX ADMIN — LEVOFLOXACIN SCH: 750 TABLET, FILM COATED ORAL at 10:10

## 2018-12-04 RX ADMIN — Medication SCH ML: at 05:50

## 2018-12-04 RX ADMIN — METFORMIN HYDROCHLORIDE SCH MG: 500 TABLET, FILM COATED ORAL at 08:12

## 2018-12-04 RX ADMIN — ENOXAPARIN SODIUM SCH: 40 INJECTION SUBCUTANEOUS at 09:51

## 2018-12-04 RX ADMIN — GUAIFENESIN SCH MG: 600 TABLET, EXTENDED RELEASE ORAL at 09:56

## 2018-12-04 RX ADMIN — LANSOPRAZOLE SCH MG: 30 TABLET, ORALLY DISINTEGRATING, DELAYED RELEASE ORAL at 05:50

## 2018-12-04 RX ADMIN — TRIAMTERENE AND HYDROCHLOROTHIAZIDE SCH TAB: 75; 50 TABLET ORAL at 09:57

## 2018-12-04 NOTE — PDOC DISCHARGE SUMMARY
General





- Admit/Disc Date/PCP


Admission Date/Primary Care Provider: 


  11/28/18 03:2 


Sukhjinder Hastings MD


Discharge Date: 12/04/18





- Discharge Diagnosis


(1) Acute respiratory failure with hypoxia


Is this a current diagnosis for this admission?: Yes   





(2) DVT prophylaxis


Is this a current diagnosis for this admission?: Yes   





(3) Diabetes mellitus type 2 in obese


Is this a current diagnosis for this admission?: Yes   


Summary: 


Patient was started on a 1500 ADA calorie thyroid diet metformin 500 twice 

daily Accu-Chek sliding scale prior to discharge sugars ranging from 100-165 no 

hypoglycemic episodes.








(4) Dyspnea


Is this a current diagnosis for this admission?: Yes   





(5) Hypertension


Is this a current diagnosis for this admission?: Yes   


Summary: 


Was placed on Cardizem and Maxide her blood pressure stayed systolic 100-130 

diastolic 60-85.








(6) Morbid obesity


Is this a current diagnosis for this admission?: Yes   


Summary: 


She was seen by the dietitian for weight reduction diet patient has been 

started on her Metformin possibility of adding a GLP-1 inhibitor for assistance 

and weight reduction will be done as an outpatient.  And long-term always 

possibility of bariatric evaluation.








- Additional Information


Resuscitation Status: Full Code


Discharge Diet: Diabetic


Discharge Activity: Activity As Tolerated


Prescriptions: 


Ipratropium/Albuterol Sulfate [Duoneb 3 ml Ampul] 3 ml NEB RTQ6HP PRN #60 

vial.neb


 PRN Reason: 


Lansoprazole [Prevacid 30 mg Odt Tablet] 30 mg PO ACBRKFST #30 tab.


Levofloxacin [Levaquin 750 mg Tablet] 750 mg PO DAILY 5 Days #5 tablet


Metformin HCl [Glucophage 500 mg Tablet] 500 mg PO BIDACBS #60 tablet


Prednisone [Deltasone 20 mg Tablet] 20 mg PO DAILY #7 tablet


Home Medications: 








Cyclobenzaprine HCl [Flexeril 10 mg Tablet] 10 mg PO TID 04/29/14 


Triamterene/Hydrochlorothiazid [Triamterene-Hctz 75-50 mg Tab] 1 tab PO DAILY 04 /29/14 


Verapamil HCl [Verapamil ER Pm] 300 mg PO QHS 04/29/14 


Acetaminophen [Tylenol Extra Strength 500 mg Tablet] 1 tab PO Q6HP PRN 11/28/18 


Cetirizine HCl [Zyrtec 10 mg Tablet] 1 tab PO DAILY 11/28/18 


Clotrimazole [Itch Relief] 1 applic TP BID 11/28/18 


Fluticasone Propionate [Flonase Nasal Spray 50 Mcg/Spray 16 gm] 1 spray NASL 

DAILY 11/28/18 


Ipratropium/Albuterol Sulfate [Duoneb 3 ml Ampul] 3 ml NEB RTQ6HP PRN #60 

vial.neb 12/04/18 


Lansoprazole [Prevacid 30 mg Odt Tablet] 30 mg PO ACBRKFST #30 tab.rap. 12/04/ 18 


Levofloxacin [Levaquin 750 mg Tablet] 750 mg PO DAILY 5 Days #5 tablet 12/04/18 


Metformin HCl [Glucophage 500 mg Tablet] 500 mg PO BIDACBS #60 tablet 12/04/18 


Prednisone [Deltasone 20 mg Tablet] 20 mg PO DAILY #7 tablet 12/04/18 


Triamterene/Hydrochlorothiazid [Maxzide Tablet] 1 tab PO DAILY  tablet 12/04/18 


Verapamil HCl [Calan Sr 120 mg Tablet.sa] 120 mg PO QHS  tablet.sa 12/04/18 


Verapamil HCl [Calan Sr 180 mg Tablet.sa] 180 mg PO QHS  tablet.sa 12/04/18 











History of Present Illness


Patient complains of: Progressive shortness of breath, difficulty swallowing 

liquids, cough


History of Present Illness: 


LILIA HAMILTON is a 63 year old female


Presents with one-week history of upper respiratory tract infection symptoms 

with chills nasal congestion sore throat but then it progressed to hoarseness 

difficulty breathing some difficulty swallowing liquids orthopnea and she 

presented to the emergency room because of the symptoms.





Hospital Course


Hospital Course: 





Patient was admitted had to be placed on BiPAP to maintain a saturation over 94

% soft tissue of the neck and the chest x-ray failed to show any evidence of 

any effusions soft tissue swelling infiltrates.  She was put on beta-2 agonists 

and IV antibiotics.  She was also started on IV steroids sputum cultures were 

done which showed staph aureus pansensitive her IV doxycycline was switched to 

Levaquin.  Her steroids were decreased throughout her hospital course as her 

symptoms improve I decrease wheezing decreased shortness of breath.  From the 

respiratory status prior to discharge she was on half a liter satting at 95% 

her activity level was increased out of bed to chair seen by physical therapy 

which she will get home health physical therapy, she never showed leukocytosis, 

fever, respiratory infiltrates.  So she will be discharged on nebulizer, 

steroids she will require outpatient sleep study for nocturnal BiPAP.





Physical Exam


Vital Signs: 


 











Temp Pulse Resp BP Pulse Ox


 


 97.7 F   72   17   120/61   95 


 


 12/04/18 12:00  12/04/18 12:00  12/04/18 12:00  12/04/18 12:00  12/04/18 12:00








 Intake & Output











 12/03/18 12/04/18 12/05/18





 06:59 06:59 06:59


 


Intake Total 1654 1340 100


 


Output Total 2525 1300 


 


Balance -871 40 100


 


Weight 229 kg 228.4 kg 











General appearance: PRESENT: no acute distress, obese


Head exam: PRESENT: atraumatic, normocephalic


Eye exam: PRESENT: conjunctiva pink, EOMI, PERRLA


Ear exam: PRESENT: normal external ear exam


Mouth exam: PRESENT: moist, tongue midline


Teeth exam: PRESENT: poor dentation


Throat exam: PRESENT: post pharyngeal erythema


Neck exam: PRESENT: full ROM.  ABSENT: carotid bruit, JVD, lymphadenopathy, 

thyromegaly


Respiratory exam: PRESENT: decreased breath sounds


Cardiovascular exam: PRESENT: RRR.  ABSENT: diastolic murmur, rubs, systolic 

murmur


Pulses: PRESENT: normal dorsalis pedis pul, +2 pedal pulses bilateral


Vascular exam: PRESENT: normal capillary refill


GI/Abdominal exam: PRESENT: normal bowel sounds, soft.  ABSENT: distended, 

guarding, mass, organolmegaly, rebound, tenderness


Rectal exam: PRESENT: deferred


Extremities exam: PRESENT: pedal edema


Additional comments: 





Decreased


Neurological exam: PRESENT: alert, awake, oriented to person, oriented to place

, oriented to time, oriented to situation, CN II-XII grossly intact.  ABSENT: 

motor sensory deficit


Psychiatric exam: PRESENT: appropriate affect, normal mood.  ABSENT: homicidal 

ideation, suicidal ideation


Skin exam: PRESENT: dry, intact, warm.  ABSENT: cyanosis, rash





Results


Laboratory Results: 


 





 12/01/18 06:49 





 12/02/18 06:28 





 





11/29/18 16:10   Sputum   Gram Stain - Final


11/29/18 16:10   Sputum   Sputum Culture - Final


                            Staphylococcus Aureus


                            Normal Tressa Absent





 











  11/28/18 11/28/18





  07:15 13:08


 


Troponin I  < 0.012  < 0.012











Impressions: 


 





Soft Tissue Neck X-Ray  11/28/18 00:35


IMPRESSION:


 


 


1. No definite acute abnormality in the neck soft tissues by


plain film criteria.


 


 


copyright 2011 [employer]- All Rights Reserved


 








Chest X-Ray  11/29/18 00:00


IMPRESSION:  LIMITED STUDY.  CANNOT EXCLUDE INFILTRATE OR PLEURAL EFFUSION ON 

THE LEFT.


 














Qualifiers





- *


PATIENT BEING DISCHARGED WITH ANY OF THE FOLLOWING DIAGNOSIS: No





Plan


Discharge Plan: 





Kirk will be discharged home, advised follow-up in the office in 1 week patient 

will require outpatient pulmonary follow-up with Dr. Laurent for sleep study 

she will be sent home with a nebulizer, wheelchair nebulizer medication short 

course of steroids metformin, antibiotics will be discontinued as she received 

a full read of the week of treatment for sensitive antibiotics also she started 

developing some diarrhea from it which was negative for C. difficile.  Advised 

for the diarrhea to start probiotics and monitor closely and notify if symptoms 

persist.


Time Spent: Greater than 30 Minutes

## 2019-01-01 ENCOUNTER — HOSPITAL ENCOUNTER (EMERGENCY)
Dept: HOSPITAL 62 - ER | Age: 64
Discharge: ANOTHER HOSPITAL | End: 2019-01-01
Payer: COMMERCIAL

## 2019-01-01 VITALS — DIASTOLIC BLOOD PRESSURE: 62 MMHG | SYSTOLIC BLOOD PRESSURE: 108 MMHG

## 2019-01-01 DIAGNOSIS — E66.01: ICD-10-CM

## 2019-01-01 DIAGNOSIS — J45.901: ICD-10-CM

## 2019-01-01 DIAGNOSIS — Z90.49: ICD-10-CM

## 2019-01-01 DIAGNOSIS — D72.829: ICD-10-CM

## 2019-01-01 DIAGNOSIS — Z79.899: ICD-10-CM

## 2019-01-01 DIAGNOSIS — I10: ICD-10-CM

## 2019-01-01 DIAGNOSIS — R11.2: ICD-10-CM

## 2019-01-01 DIAGNOSIS — E11.9: ICD-10-CM

## 2019-01-01 DIAGNOSIS — R10.13: Primary | ICD-10-CM

## 2019-01-01 DIAGNOSIS — Z79.84: ICD-10-CM

## 2019-01-01 LAB
ADD MANUAL DIFF: NO
ANION GAP SERPL CALC-SCNC: 11 MMOL/L (ref 5–19)
BASOPHILS # BLD AUTO: 0 10^3/UL (ref 0–0.2)
BASOPHILS NFR BLD AUTO: 0.2 % (ref 0–2)
BUN SERPL-MCNC: 27 MG/DL (ref 7–20)
CALCIUM: 8.5 MG/DL (ref 8.4–10.2)
CHLORIDE SERPL-SCNC: 95 MMOL/L (ref 98–107)
CO2 SERPL-SCNC: 29 MMOL/L (ref 22–30)
EOSINOPHIL # BLD AUTO: 0 10^3/UL (ref 0–0.6)
EOSINOPHIL NFR BLD AUTO: 0.1 % (ref 0–6)
ERYTHROCYTE [DISTWIDTH] IN BLOOD BY AUTOMATED COUNT: 16.9 % (ref 11.5–14)
GLUCOSE SERPL-MCNC: 149 MG/DL (ref 75–110)
HCT VFR BLD CALC: 41.9 % (ref 36–47)
HGB BLD-MCNC: 13.9 G/DL (ref 12–15.5)
LYMPHOCYTES # BLD AUTO: 0.8 10^3/UL (ref 0.5–4.7)
LYMPHOCYTES NFR BLD AUTO: 6.4 % (ref 13–45)
MCH RBC QN AUTO: 28.4 PG (ref 27–33.4)
MCHC RBC AUTO-ENTMCNC: 33.2 G/DL (ref 32–36)
MCV RBC AUTO: 86 FL (ref 80–97)
MONOCYTES # BLD AUTO: 0.4 10^3/UL (ref 0.1–1.4)
MONOCYTES NFR BLD AUTO: 3.7 % (ref 3–13)
NEUTROPHILS # BLD AUTO: 10.9 10^3/UL (ref 1.7–8.2)
NEUTS SEG NFR BLD AUTO: 89.6 % (ref 42–78)
PLATELET # BLD: 320 10^3/UL (ref 150–450)
POTASSIUM SERPL-SCNC: 3.5 MMOL/L (ref 3.6–5)
RBC # BLD AUTO: 4.9 10^6/UL (ref 3.72–5.28)
SODIUM SERPL-SCNC: 134.6 MMOL/L (ref 137–145)
TOTAL CELLS COUNTED % (AUTO): 100 %
WBC # BLD AUTO: 12.2 10^3/UL (ref 4–10.5)

## 2019-01-01 PROCEDURE — 93010 ELECTROCARDIOGRAM REPORT: CPT

## 2019-01-01 PROCEDURE — 80048 BASIC METABOLIC PNL TOTAL CA: CPT

## 2019-01-01 PROCEDURE — 93005 ELECTROCARDIOGRAM TRACING: CPT

## 2019-01-01 PROCEDURE — 85025 COMPLETE CBC W/AUTO DIFF WBC: CPT

## 2019-01-01 PROCEDURE — 84484 ASSAY OF TROPONIN QUANT: CPT

## 2019-01-01 PROCEDURE — 82962 GLUCOSE BLOOD TEST: CPT

## 2019-01-01 PROCEDURE — 99285 EMERGENCY DEPT VISIT HI MDM: CPT

## 2019-01-01 PROCEDURE — 36415 COLL VENOUS BLD VENIPUNCTURE: CPT

## 2019-01-01 PROCEDURE — 96365 THER/PROPH/DIAG IV INF INIT: CPT

## 2019-01-01 PROCEDURE — 71045 X-RAY EXAM CHEST 1 VIEW: CPT

## 2019-01-01 PROCEDURE — 96375 TX/PRO/DX INJ NEW DRUG ADDON: CPT

## 2019-01-01 NOTE — RADIOLOGY REPORT (SQ)
EXAM DESCRIPTION:  CHEST SINGLE VIEW



COMPLETED DATE/TIME:  1/1/2019 11:18 am



REASON FOR STUDY:  hypoxia



COMPARISON:  11/29/2018



EXAM PARAMETERS:  NUMBER OF VIEWS: One view.

TECHNIQUE: Single frontal radiographic view of the chest acquired.

RADIATION DOSE: NA

LIMITATIONS: None.



FINDINGS:  LUNGS AND PLEURA: No opacities, masses or pneumothorax. No pleural effusion.

MEDIASTINUM AND HILAR STRUCTURES: No masses.  Contour normal.

HEART AND VASCULAR STRUCTURES: Heart normal in size.  Normal vasculature.

BONES: No acute findings.

HARDWARE: None in the chest.

OTHER: No other significant finding.



IMPRESSION:  Low volume AP examination without acute abnormality of the lungs.



TECHNICAL DOCUMENTATION:  JOB ID:  [id number]

 2011 [employer]- All Rights Reserved



Reading location - IP/workstation name: [email or web address]

## 2019-01-01 NOTE — ER DOCUMENT REPORT
ED General





- General


Chief Complaint: Nausea/Vomiting


Stated Complaint: NAUSEA,VOMITING


Time Seen by Provider: 01/01/19 10:32


Notes: 





This is a morbidly obese 64-year-old female with a reported history of abdominal

hernia of unknown origin, whose girth and obesity seems to be concentrated 

centripetally, who presents with 2 days of nausea, and one day of severe upper 

abdominal pain nonradiating constant associated with actual vomiting.  No fever.

 Last bowel was yesterday.  Recently admitted for respiratory failure, has sleep

apnea and chronic hypoxia/obesity hypoventilation.  She denies fever currently.


TRAVEL OUTSIDE OF THE [place] IN LAST 30 DAYS: No





- Related Data


Allergies/Adverse Reactions: 


                                        





No Known Allergies Allergy (Verified 01/01/19 10:35)


   











Past Medical History





- Social History


Smoking Status: Unknown if Ever Smoked


Family History: Reviewed & Not Pertinent, COPD, CVA, DM, Hyperlipidemia, 

Hypertension, Malignancy


Patient has suicidal ideation: No


Patient has homicidal ideation: No





- Past Medical History


Cardiac Medical History: Reports: Hx Hypertension - medicated


   Denies: Hx Heart Attack


Pulmonary Medical History: Reports: Hx Asthma


Neurological Medical History: Denies: Hx Cerebrovascular Accident, Hx Seizures


Endocrine Medical History: Reports: Hx Diabetes Mellitus Type 2


Renal/ Medical History: Denies: Hx Peritoneal Dialysis


GI Medical History: Denies: Hx Hepatitis, Hx Hiatal Hernia, Hx Ulcer


Infectious Medical History: Denies: Hx Hepatitis


Past Surgical History: Reports: Hx Cholecystectomy.  Denies: Hx Hysterectomy, Hx

Mastectomy, Hx Open Heart Surgery, Hx Pacemaker





Review of Systems





- Review of Systems


Notes: 





REVIEW OF SYSTEMS





GEN: Denies fever, chills, weight loss


ENT: Denies sore throat, nasal discharge, ear pain


EYES: Denies blurry vision, eye pain, discharge


CV: Denies chest pain, palpitations, edema


RESP: Denies cough, shortness of breath, wheezing


GI: Abdominal pain, nausea, vomiting, denies diarrhea


MSK: Denies joint pain/swelling, edema, 


SKIN: Denies rash, skin lesions


LYMPH: Denies swollen glands/lymph nodes


NEURO: Denies headache, focal weakness or numbness, dizziness


PSYCH: Denies depression, suicidal or homicidal ideation








PHYSICAL EXAMINATION





General: Lying on side, morbid obese


Head: Atraumatic, normocephalic


ENT: Mouth normal, oropharynx moist, no exudates or tonsillar enlargement


Eyes: Conjunctiva normal, pupils equal, lids normal


Neck: No JVD, supple, no guarding


CVS: Normal rate, regular rhythm, no murmurs


Resp: No resp distress, equal and normal breath sounds bilaterally


GI:  difficult exam.


Ext: No deformities, no edema, normal range of motion in upper and lower ext


Back: No CVA or midline TTP


Skin: No rash, warm


Lymphatic: No lymphadeopathy noted


Neuro: Awake, alert.  Face symmetric.  GCS 15.





Physical Exam





- Vital signs


Vitals: 


                                        











Resp Pulse Ox


 


 22 H  95 


 


 01/01/19 11:09  01/01/19 11:09














Course





- Re-evaluation


Re-evalutation: 





01/01/19 13:38


Morbidly obese patient presents with upper abdominal pain in the presence of po

ssible hernia as well as vomiting.  She is quite tender.  I am concerned for an 

intraductal process such as hernia given the presence of hernia.  She does not 

have a gallbladder.  He very difficult to examine and will be even more 

difficult to obtain imaging on.


We did get an IV and she has a mild white count.  Otherwise labs are normal.  

She was given symptomatically therapy and made n.p.o.


Mobile conversation with radiology at [facility] cannot accommodate her on the CT 

scanner.  I spoke with Shirin Corey, Long, Messi Garcia, and finally Khoa who 

finally accepted the patient is an ED to ED transfer her to the hospitalist for 

further workup.  I also consulted Dr. Boone from surgery who is seen the 

patient and recommended antibiotics prior to transfer in case this is an 

abdominal infection.





- Vital Signs


Vital signs: 


                                        











Temp Pulse Resp BP Pulse Ox


 


 98.9 F   96   26 H  112/74   94 


 


 01/01/19 12:01  01/01/19 11:10  01/01/19 13:22  01/01/19 13:22  01/01/19 13:22














- Laboratory


Result Diagrams: 


                                 01/01/19 11:43





                                 01/01/19 11:43


Laboratory results interpreted by me: 


                                        











  01/01/19 01/01/19





  11:43 11:43


 


WBC  12.2 H 


 


RDW  16.9 H 


 


Seg Neutrophils %  89.6 H 


 


Lymphocytes %  6.4 L 


 


Absolute Neutrophils  10.9 H 


 


Sodium   134.6 L


 


Potassium   3.5 L


 


Chloride   95 L


 


BUN   27 H


 


Est GFR ( Amer)   55 L


 


Est GFR (Non-Af Amer)   45 L


 


Glucose   149 H














Discharge





- Discharge


Clinical Impression: 


 Upper abdominal pain





Condition: Fair


Disposition: [facility]


Referrals: 


DAVE HARPER MD [Primary Care Provider] - Follow up as needed

## 2019-01-01 NOTE — PDOC CONSULTATION
Consultation


Consult Date: 19


Attending physician:: SHAQUILLE ZIMMER


Consult reason:: Abdominal pain





History of Present Illness


Admission Date/PCP: 


  





  DAVE HARPER MD





Patient complains of: Abdominal pain


History of Present Illness: 


LILIA HAMILTON is a 64 year old female








With a history of morbid obesity, resident of [place], who comes to the 

emergency department via ground rescue complaining of a several day history of 

abdominal pain.  Patient recently hospitalized at [facility] under

the care of Dr. Hastings for respiratory infection.  Patient states last bowel 

movement yesterday firm but not unusually hard.  Abdominal pain started in the 

epigastric area, worsening associated withnausea and multiple episodes of 

vomiting.  Patient denies history of trauma, or previous abdominal pain.  She 

last ate yesterday, nothing today.  She is accompanied by her  who 

provides some of her medical history but he has had a stroke and has cognitive 

impairment.  Patient is evaluated in the emergency department by Dr. Zimmer 

and found to have right upper quadrant tenderness.  White blood cell count 

slightly elevated at 12,000.  She is status post cholecystectomy as well as 

 section.  No imaging studies have been performed today; she is too 

large to fit into the CAT scanner.  Dr. Zimmer is making efforts to transfer 

the patient to a tertiary care facility for further evaluation and treatment.  

Surgery was consulted for opinion regarding management.





Past Medical History


Past Medical History: 





Morbid obesity, sleep apnea, panniculitis.


Cardiac Medical History: Reports: Hypertension - medicated


   Denies: Myocardial Infarction


Pulmonary Medical History: Reports: Asthma


Neurological Medical History: 


   Denies: Seizures


Endocrine Medical History: Reports: Diabetes Mellitus Type 2


GI Medical History: 


   Denies: Hepatitis, Hiatal Hernia


Hematology: 


   Denies: Anemia, Sickle Cell Disease





Past Surgical History


Past Surgical History: 





 section, tonsillectomy, cholecystectomy


Past Surgical History: Reports: Cholecystectomy


   Denies: Amputation, Hysterectomy, Mastectomy, Pacemaker





Social History


Information Source: Relative - Note: Patient lives primarily on the floor and " 

but" walks; she does eat and she does have bowel movements.


Smoking Status: Unknown if Ever Smoked


Frequency of Alcohol Use: None


Hx Recreational Drug Use: No


Hx Prescription Drug Abuse: No





Family History


Family History: Reviewed & Not Pertinent, COPD, CVA, DM, Hyperlipidemia, 

Hypertension, Malignancy


Parental Family History Reviewed: Yes


Children Family History Reviewed: Yes


Sibling(s) Family History Reviewed.: Yes





Medication/Allergy


Home Medications: 








Cyclobenzaprine HCl [Flexeril 10 mg Tablet] 10 mg PO TID 14 


Triamterene/Hydrochlorothiazid [Triamterene-Hctz 75-50 mg Tab] 1 tab PO DAILY 

14 


Verapamil HCl [Verapamil ER Pm] 300 mg PO QHS 14 


Acetaminophen [Tylenol Extra Strength 500 mg Tablet] 1 tab PO Q6HP PRN 18 


Cetirizine HCl [Zyrtec 10 mg Tablet] 1 tab PO DAILY 18 


Clotrimazole [Itch Relief] 1 applic TP BID 18 


Fluticasone Propionate [Flonase Nasal Spray 50 Mcg/Spray 16 gm] 1 spray NASL 

DAILY 18 


Ipratropium/Albuterol Sulfate [Duoneb 3 ml Ampul] 3 ml NEB RTQ6HP PRN #60 

vial.neb 18 


Lansoprazole [Prevacid 30 mg Odt Tablet] 30 mg PO ACBRKFST #30 tab.rap. 

18 


Levofloxacin [Levaquin 750 mg Tablet] 750 mg PO DAILY 5 Days #5 tablet 18 


Metformin HCl [Glucophage 500 mg Tablet] 500 mg PO BIDACBS #60 tablet 18 


Prednisone [Deltasone 20 mg Tablet] 20 mg PO DAILY #7 tablet 18 


Triamterene/Hydrochlorothiazid [Maxzide Tablet] 1 tab PO DAILY  tablet 18 


Verapamil HCl [Calan Sr 120 mg Tablet.sa] 120 mg PO QHS  tablet.sa 18 


Verapamil HCl [Calan Sr 180 mg Tablet.sa] 180 mg PO QHS  tablet.sa 18 








Allergies/Adverse Reactions: 


                                        





No Known Allergies Allergy (Verified 19 10:35)


   











Review of Systems


ROS unobtainable: Due to mental status





Physical Exam


Vital Signs: 


                                        











Temp Pulse Resp BP Pulse Ox


 


 98.9 F   96   16   111/66   96 


 


 19 12:01  19 11:10  19 12:01  19 12:00  19 12:01








                                 Intake & Output











 18





 06:59 06:59 06:59


 


Weight   217.724 kg











General appearance: PRESENT: other - Morbidly obese mild respiratory distress


Head exam: PRESENT: normocephalic


Eye exam: PRESENT: EOMI


Mouth exam: PRESENT: other - Oral pharynx with dark green and brownish plaque on

the mucous membranes


Respiratory exam: PRESENT: rhonchi - Distant breath sounds bilaterally


GI/Abdominal exam: PRESENT: other - Massive abdominal girth with anterior 

displacement; large panniculus with mild panniculitis.  There is abdominal 

tenderness more on the right than the left side.  Patient preferentially laying 

in the left semilateral decubitus position


Rectal exam: PRESENT: deferred


Musculoskeletal exam: PRESENT: other - Morbidly obese.





Results


Laboratory Results: 


                                        





                                 19 11:43 





                                 19 11:43 





                                        











  19





  11:43 11:43


 


WBC  12.2 H 


 


RBC  4.90 


 


Hgb  13.9 


 


Hct  41.9 


 


MCV  86 


 


MCH  28.4 


 


MCHC  33.2 


 


RDW  16.9 H 


 


Plt Count  320 


 


Seg Neutrophils %  89.6 H 


 


Lymphocytes %  6.4 L 


 


Monocytes %  3.7 


 


Eosinophils %  0.1 


 


Basophils %  0.2 


 


Absolute Neutrophils  10.9 H 


 


Absolute Lymphocytes  0.8 


 


Absolute Monocytes  0.4 


 


Absolute Eosinophils  0.0 


 


Absolute Basophils  0.0 


 


Sodium   134.6 L


 


Potassium   3.5 L


 


Chloride   95 L


 


Carbon Dioxide   29


 


Anion Gap   11


 


BUN   27 H


 


Creatinine   1.21


 


Est GFR ( Amer)   55 L


 


Est GFR (Non-Af Amer)   45 L


 


Glucose   149 H


 


Calcium   8.5








                                        











  19





  11:43


 


Troponin I  < 0.012











Impressions: 


                                        





Chest X-Ray  19 10:32


IMPRESSION:  Low volume AP examination without acute abnormality of the lungs.


 














Assessment & Plan





- Diagnosis


(1) Acute abdomen


Is this a current diagnosis for this admission?: Yes   


Plan: 


Impression: Patient has acute abdominal pain, tenderness to palpation and a mild

leukocytosis; mild dehydration.  Assessment limited due to patient's morbid 

status, and absence of imaging such as CT scanning.





Recommendations:





1.  I have discussed the situation with the patient, her  and the 

emergency room physician Dr. Zimmer.  I am concerned the patient may be evol

ving an acute intra-abdominal process that may require surgical intervention.  

In the interest of the patient's safety, and the welfare of all involved, this 

patient should not be operated on at [facility] to the limited 

capabilities, facilities, absence  of bariatric resources etc.





2.  She is not in extremist at this moment.  I do not believe she needs 

exploratory laparotomy right now.  I believe she would best be served by  

transfer to tertiary care facility with machinery, equipment, and expertise in 

the area of managing bariatric patients.; efforts are already underway to affect

that transfer.








(2) Bronchial asthma


Qualifiers: 


   Asthma severity: unspecified severity   Asthma persistence: unspecified   

Asthma complication type: with acute exacerbation   Qualified Code(s): J45.901 -

Unspecified asthma with (acute) exacerbation   





(3) Hypertension


Qualifiers: 


   Hypertension type: essential hypertension   Qualified Code(s): I10 - 

Essential (primary) hypertension

## 2019-01-01 NOTE — EKG REPORT
SEVERITY:- ABNORMAL ECG -

SINUS RHYTHM

PROBABLE ANTEROSEPTAL INFARCT, AGE INDETERM

BORDERLINE T ABNORMALITIES, INFERIOR LEADS

BORDERLINE PROLONGED QT INTERVAL

:

Confirmed by: Rosa Quinn MD 01-Jan-2019 14:19:03

## 2019-03-25 ENCOUNTER — HOSPITAL ENCOUNTER (INPATIENT)
Dept: HOSPITAL 62 - OROUT | Age: 64
Discharge: LEFT BEFORE DISCHARGE | DRG: 4 | End: 2019-03-25
Attending: INTERNAL MEDICINE | Admitting: INTERNAL MEDICINE
Payer: COMMERCIAL

## 2019-03-25 VITALS — DIASTOLIC BLOOD PRESSURE: 55 MMHG | SYSTOLIC BLOOD PRESSURE: 87 MMHG

## 2019-03-25 DIAGNOSIS — E11.9: ICD-10-CM

## 2019-03-25 DIAGNOSIS — Z82.3: ICD-10-CM

## 2019-03-25 DIAGNOSIS — J96.01: Primary | ICD-10-CM

## 2019-03-25 DIAGNOSIS — J44.9: ICD-10-CM

## 2019-03-25 DIAGNOSIS — R18.8: ICD-10-CM

## 2019-03-25 DIAGNOSIS — Z83.6: ICD-10-CM

## 2019-03-25 DIAGNOSIS — J96.02: ICD-10-CM

## 2019-03-25 DIAGNOSIS — Z87.891: ICD-10-CM

## 2019-03-25 DIAGNOSIS — E66.01: ICD-10-CM

## 2019-03-25 DIAGNOSIS — I10: ICD-10-CM

## 2019-03-25 DIAGNOSIS — K76.0: ICD-10-CM

## 2019-03-25 DIAGNOSIS — Z82.49: ICD-10-CM

## 2019-03-25 DIAGNOSIS — K74.60: ICD-10-CM

## 2019-03-25 DIAGNOSIS — Z90.49: ICD-10-CM

## 2019-03-25 DIAGNOSIS — Z80.9: ICD-10-CM

## 2019-03-25 DIAGNOSIS — E78.5: ICD-10-CM

## 2019-03-25 DIAGNOSIS — Z83.3: ICD-10-CM

## 2019-03-25 DIAGNOSIS — I46.9: ICD-10-CM

## 2019-03-25 LAB
ADD MANUAL DIFF: NO
ALBUMIN SERPL-MCNC: 3.6 G/DL (ref 3.5–5)
ALP SERPL-CCNC: 128 U/L (ref 38–126)
ALT SERPL-CCNC: 20 U/L (ref 9–52)
ANION GAP SERPL CALC-SCNC: 19 MMOL/L (ref 5–19)
APPEARANCE FLD: (no result)
ARTERIAL BLOOD FIO2: (no result)
ARTERIAL BLOOD FIO2: (no result)
ARTERIAL BLOOD H2CO3: 0.98 MMOL/L (ref 1.05–1.35)
ARTERIAL BLOOD H2CO3: 2.03 MMOL/L (ref 1.05–1.35)
ARTERIAL BLOOD HCO3: 15.6 MMOL/L (ref 20–24)
ARTERIAL BLOOD HCO3: 16.8 MMOL/L (ref 20–24)
ARTERIAL BLOOD PCO2: 32.5 MMHG (ref 35–45)
ARTERIAL BLOOD PCO2: 67.4 MMHG (ref 35–45)
ARTERIAL BLOOD PH: 6.98 (ref 7.35–7.45)
ARTERIAL BLOOD PH: 7.33 (ref 7.35–7.45)
ARTERIAL BLOOD PO2: 51.8 MMHG (ref 80–100)
ARTERIAL BLOOD PO2: 59.4 MMHG (ref 80–100)
ARTERIAL BLOOD TOTAL CO2: 17.7 MMOL/L (ref 21–25)
ARTERIAL BLOOD TOTAL CO2: 17.7 MMOL/L (ref 21–25)
AST SERPL-CCNC: 33 U/L (ref 14–36)
BASE EXCESS BLDA CALC-SCNC: -16.6 MMOL/L
BASE EXCESS BLDA CALC-SCNC: -8 MMOL/L
BASE EXCESS BLDV CALC-SCNC: -7.8 MMOL/L
BASOPHILS # BLD AUTO: 0.1 10^3/UL (ref 0–0.2)
BASOPHILS NFR BLD AUTO: 0.4 % (ref 0–2)
BILIRUB DIRECT SERPL-MCNC: 0.4 MG/DL (ref 0–0.4)
BILIRUB SERPL-MCNC: 1.4 MG/DL (ref 0.2–1.3)
BODY FLD TYPE: (no result)
BUN SERPL-MCNC: 34 MG/DL (ref 7–20)
CALCIUM: 9.9 MG/DL (ref 8.4–10.2)
CHLORIDE SERPL-SCNC: 97 MMOL/L (ref 98–107)
CK MB SERPL-MCNC: 1.56 NG/ML (ref ?–4.55)
CK MB SERPL-MCNC: 2.4 NG/ML (ref ?–4.55)
CK SERPL-CCNC: 30 U/L (ref 30–135)
CO2 SERPL-SCNC: 17 MMOL/L (ref 22–30)
EOSINOPHIL # BLD AUTO: 0.1 10^3/UL (ref 0–0.6)
EOSINOPHIL NFR BLD AUTO: 0.5 % (ref 0–6)
ERYTHROCYTE [DISTWIDTH] IN BLOOD BY AUTOMATED COUNT: 17.9 % (ref 11.5–14)
FLUID COLOR: (no result)
FLUID SOURCE: (no result)
FLUID VISCOSITY: (no result)
GLUCOSE SERPL-MCNC: 215 MG/DL (ref 75–110)
HCO3 BLDV-SCNC: 18 MMOL/L (ref 20–32)
HCT VFR BLD CALC: 43.6 % (ref 36–47)
HGB BLD-MCNC: 14.2 G/DL (ref 12–15.5)
INR PPP: 1.08
LYMPHOCYTES # BLD AUTO: 2 10^3/UL (ref 0.5–4.7)
LYMPHOCYTES NFR BLD AUTO: 11.2 % (ref 13–45)
MCH RBC QN AUTO: 27.3 PG (ref 27–33.4)
MCHC RBC AUTO-ENTMCNC: 32.6 G/DL (ref 32–36)
MCV RBC AUTO: 84 FL (ref 80–97)
MONOCYTES # BLD AUTO: 1.1 10^3/UL (ref 0.1–1.4)
MONOCYTES NFR BLD AUTO: 6.5 % (ref 3–13)
NEUTROPHILS # BLD AUTO: 14.2 10^3/UL (ref 1.7–8.2)
NEUTS SEG NFR BLD AUTO: 81.4 % (ref 42–78)
PCO2 BLDV: 38 MMHG (ref 35–63)
PH BLDV: 7.29 [PH] (ref 7.3–7.42)
PLATELET # BLD: 510 10^3/UL (ref 150–450)
POTASSIUM SERPL-SCNC: 4.4 MMOL/L (ref 3.6–5)
PROT SERPL-MCNC: 7.1 G/DL (ref 6.3–8.2)
PROTHROMBIN TIME: 14.5 SEC (ref 11.4–15.4)
RBC # BLD AUTO: 5.22 10^6/UL (ref 3.72–5.28)
SAO2 % BLDA: 74.2 % (ref 94–98)
SAO2 % BLDA: 84.8 % (ref 94–98)
SODIUM SERPL-SCNC: 133 MMOL/L (ref 137–145)
TOTAL CELLS COUNTED % (AUTO): 100 %
TROPONIN I SERPL-MCNC: 0.18 NG/ML
TROPONIN I SERPL-MCNC: 0.46 NG/ML
WBC # BLD AUTO: 17.5 10^3/UL (ref 4–10.5)

## 2019-03-25 PROCEDURE — 96365 THER/PROPH/DIAG IV INF INIT: CPT

## 2019-03-25 PROCEDURE — 82803 BLOOD GASES ANY COMBINATION: CPT

## 2019-03-25 PROCEDURE — 84484 ASSAY OF TROPONIN QUANT: CPT

## 2019-03-25 PROCEDURE — 89050 BODY FLUID CELL COUNT: CPT

## 2019-03-25 PROCEDURE — 94640 AIRWAY INHALATION TREATMENT: CPT

## 2019-03-25 PROCEDURE — 94002 VENT MGMT INPAT INIT DAY: CPT

## 2019-03-25 PROCEDURE — 87040 BLOOD CULTURE FOR BACTERIA: CPT

## 2019-03-25 PROCEDURE — 87205 SMEAR GRAM STAIN: CPT

## 2019-03-25 PROCEDURE — 96367 TX/PROPH/DG ADDL SEQ IV INF: CPT

## 2019-03-25 PROCEDURE — 0B110F4 BYPASS TRACHEA TO CUTANEOUS WITH TRACHEOSTOMY DEVICE, OPEN APPROACH: ICD-10-PCS | Performed by: SURGERY

## 2019-03-25 PROCEDURE — 51702 INSERT TEMP BLADDER CATH: CPT

## 2019-03-25 PROCEDURE — 36415 COLL VENOUS BLD VENIPUNCTURE: CPT

## 2019-03-25 PROCEDURE — 99291 CRITICAL CARE FIRST HOUR: CPT

## 2019-03-25 PROCEDURE — 71045 X-RAY EXAM CHEST 1 VIEW: CPT

## 2019-03-25 PROCEDURE — 87070 CULTURE OTHR SPECIMN AEROBIC: CPT

## 2019-03-25 PROCEDURE — 0W9G3ZZ DRAINAGE OF PERITONEAL CAVITY, PERCUTANEOUS APPROACH: ICD-10-PCS | Performed by: SURGERY

## 2019-03-25 PROCEDURE — 82550 ASSAY OF CK (CPK): CPT

## 2019-03-25 PROCEDURE — 80053 COMPREHEN METABOLIC PANEL: CPT

## 2019-03-25 PROCEDURE — 94660 CPAP INITIATION&MGMT: CPT

## 2019-03-25 PROCEDURE — 87075 CULTR BACTERIA EXCEPT BLOOD: CPT

## 2019-03-25 PROCEDURE — 93010 ELECTROCARDIOGRAM REPORT: CPT

## 2019-03-25 PROCEDURE — 85610 PROTHROMBIN TIME: CPT

## 2019-03-25 PROCEDURE — 85025 COMPLETE CBC W/AUTO DIFF WBC: CPT

## 2019-03-25 PROCEDURE — 93005 ELECTROCARDIOGRAM TRACING: CPT

## 2019-03-25 PROCEDURE — 82553 CREATINE MB FRACTION: CPT

## 2019-03-25 PROCEDURE — 5A1935Z RESPIRATORY VENTILATION, LESS THAN 24 CONSECUTIVE HOURS: ICD-10-PCS | Performed by: INTERNAL MEDICINE

## 2019-03-25 PROCEDURE — 96375 TX/PRO/DX INJ NEW DRUG ADDON: CPT

## 2019-03-25 PROCEDURE — 84443 ASSAY THYROID STIM HORMONE: CPT

## 2019-03-25 PROCEDURE — 0BH17EZ INSERTION OF ENDOTRACHEAL AIRWAY INTO TRACHEA, VIA NATURAL OR ARTIFICIAL OPENING: ICD-10-PCS | Performed by: ANESTHESIOLOGY

## 2019-03-25 NOTE — RADIOLOGY REPORT (SQ)
EXAM DESCRIPTION:  CHEST SINGLE VIEW



COMPLETED DATE/TIME:  3/25/2019 9:34 am



REASON FOR STUDY:  difficulty breathing



COMPARISON:  1/1/2019



EXAM PARAMETERS:  NUMBER OF VIEWS: One view.

TECHNIQUE: Single frontal radiographic view of the chest acquired.

RADIATION DOSE: NA

LIMITATIONS: None.



FINDINGS:  LUNGS AND PLEURA: Low volume examination.

MEDIASTINUM AND HILAR STRUCTURES: No masses.  Contour normal.

HEART AND VASCULAR STRUCTURES: Heart normal in size.  Normal vasculature.

BONES: No acute findings.

HARDWARE: None in the chest.

OTHER: No other significant finding.



IMPRESSION:  Low volume AP examination without acute abnormality of the lungs.



TECHNICAL DOCUMENTATION:  JOB ID:  [id number]

 2011 [employer]- All Rights Reserved



Reading location - IP/workstation name: [email or web address]

## 2019-03-25 NOTE — DEATH SUMMARY
Death Summary


Date : 19


Time of Death:: 13:15


Autopsy: No


Resuscitation Status: Full Code


Primary Care Provider: Sukhjinder Hastings


Consulting Provider: ,





- Final Diagnosis


(1) Acute respiratory failure with hypoxia


Is this a current diagnosis for this admission?: Yes   





(2) Acidosis


Is this a current diagnosis for this admission?: Yes   





(3) Cirrhosis


Is this a current diagnosis for this admission?: Yes   





(4) Cardiac arrest


Is this a current diagnosis for this admission?: Yes   


Hospital Course:: 





PATIENT PRESENTED TO HOSPITAL SINCE Monday states woke regular did have poor 

appetite no other symptoms till she told him she needed to go to hospital. 

PATIENT PRESENTED TO ed in respiratory failure with saturation in 84% she was 

placed on BIPAP after blood gas was done showed Hypoxic and Hypercapneic 

Respiratory failure she was intubated prior she had paracenthesis done by 

Surgery. Still even with intubation and antibiotic for elevated white count. She

still had low oxygen saturation and after decision by ED DOCTOR AND Surgeon was 

taken to OR for emergent tracheostomy. During OR there was still low oxygen sat.

Upon arrival to ICU she went into cardiopulmonary arrest. ACLS protocol was run 

by Head Hospitalist and Cardiologist. She recieved 7 epinephrine, 4 Amps 

bicarbonate, 3 atropine. She regained pulse and blood pressure which was low she

was started on levophed, epinephrine, dopamine all at maximun dose. She went 

back to arrest and ASLS protocol was instituted but after discussion with family

regarding prolonged measures and long period of hypoxia. That the probability of

premanent brain damage high and this point her  Robinson Nair decided to 

withhold any further resuscitation was pronounced dead at 3:14.

## 2019-03-25 NOTE — RADIOLOGY REPORT (SQ)
EXAM DESCRIPTION:  CHEST SINGLE VIEW



COMPLETED DATE/TIME:  3/25/2019 1:48 pm



REASON FOR STUDY:  STATUS POST TRACHEOSTOMY



COMPARISON:  3/25/2019



EXAM PARAMETERS:  NUMBER OF VIEWS: One view.

TECHNIQUE: Single frontal radiographic view of the chest acquired.

RADIATION DOSE: NA

LIMITATIONS: Patient is rotated to the left.



FINDINGS:  LUNGS AND PLEURA: There is increased opacification in the left upper lobe.

MEDIASTINUM AND HILAR STRUCTURES: No masses.  Contour normal.

HEART AND VASCULAR STRUCTURES: Heart normal in size.  Normal vasculature.

BONES: No acute findings.

HARDWARE: Tracheostomy tube.

OTHER: No other significant finding.



IMPRESSION:  Tracheostomy tube.  Cannot exclude left upper lobe pneumonia.  Limited study.



TECHNICAL DOCUMENTATION:  JOB ID:  [id number]

 2011 [employer]- All Rights Reserved



Reading location - IP/workstation name: [email or web address]

## 2019-03-25 NOTE — RADIOLOGY REPORT (SQ)
EXAM DESCRIPTION:  CHEST SINGLE VIEW



COMPLETED DATE/TIME:  3/25/2019 11:49 am



REASON FOR STUDY:  s/p intubation



COMPARISON:  3/25/2019.



EXAM PARAMETERS:  NUMBER OF VIEWS: One view.

TECHNIQUE: Single frontal radiographic view of the chest acquired.

RADIATION DOSE: NA

LIMITATIONS: Limited images due to positioning and the patient's body habitus.



FINDINGS:  LUNGS AND PLEURA: No opacities, masses or pneumothorax. No pleural effusion.

MEDIASTINUM AND HILAR STRUCTURES: No masses.  Contour normal.

HEART AND VASCULAR STRUCTURES: Heart normal in size.  Normal vasculature.

BONES: No acute findings.

HARDWARE: Endotracheal tube present with the tip located 1.5 cm proximal to the lynn.  Nasogastric 
tube with the tip difficult to visualize but appears to be below the diaphragm.

OTHER: No other significant finding.



IMPRESSION:  LIMITED STUDY.  LIFE LINES AS DESCRIBED.  NO OTHER SIGNIFICANT FINDINGS.



TECHNICAL DOCUMENTATION:  JOB ID:  [id number]

 2011 [employer]- All Rights Reserved



Reading location - IP/workstation name: [email or web address]

## 2019-03-25 NOTE — OPERATIVE REPORT
Operative Report


DATE OF SURGERY: 03/25/19


PREOPERATIVE DIAGNOSIS: 1.  Acute respiratory failure.  2.  Morbid obesity.  3. 

Cirrhosis.  4.  Ascites


POSTOPERATIVE DIAGNOSIS: Same


OPERATION: 1.  Focused ultrasound abdominal wall.  2.  Ultrasound directed 

paracentesis right lower quadrant with drainage of ascites


SURGEON: DALTON ABBASI


ANESTHESIA: Local


TISSUE REMOVED OR ALTERED: Ascitic fluid


COMPLICATIONS: 





None


ESTIMATED BLOOD LOSS: Scant


INTRAOPERATIVE FINDINGS: See below


PROCEDURE: 





Patient was seen in trauma bay 2.  She was in obvious respiratory distress on a 

facemask rebreathing device.  Right side of the abdomen exposed.  Patient had a 

large abdominal girth, large panniculus.





Laboratory profile reviewed.  PT at 14.  Consent provided





Right lower quadrant scan.  Suitable site for placement of the catheter was 

identified with peritoneal fluid.  The right lower quadrant was then prepped and

draped in sterile fashion.  Surgical plan surgical timeout were again reviewed.





The skin was anesthetized with 1/4% Marcaine.  A nick was made the skin with 11 

blade, and the percutaneous single unit including trocar, pigtail catheter, was 

threaded into the free peritoneal space.  The trocar was removed, catheter 

hooked to tubing and drainage device.  Patient then began draining cloudy 

nonpurulent yellow fluid.  3 test tubes of the aspirate was sent for analysis 

with orders put into the computer by Dr. Izaguirre.





We now spent some time securing the catheter to the anterior abdominal wall with

tape for temporary positioning.





Patient tolerated procedure well.

## 2019-03-25 NOTE — OPERATIVE REPORT
Nonrecallable Operative Report


DATE OF SURGERY: 03/25/19


PREOPERATIVE DIAGNOSIS: 1.  Acute respiratory failure.  2.  Morbid obesity


POSTOPERATIVE DIAGNOSIS: Same


OPERATION: Emergency open tracheostomy with #6 Shiley cuffed nonfenestrated tube


SURGEON: DALTON ABBASI


ANESTHESIA: GA


TISSUE REMOVED OR ALTERED: Nothing


COMPLICATIONS: 





None


ESTIMATED BLOOD LOSS: 100 cc


INTRAOPERATIVE FINDINGS: See below


PROCEDURE: 





Patient was seen in the emergency department where she had previously undergone 

paracentesis.  She developed profound respiratory distress refractory to CPAP 

support.  The patient underwent oral tracheal intubation by the anesthesia team,

then apparent attempt at intubation all in the emergency department.  Surgery 

was reconsulted to evaluate the patient for possible tracheostomy tube.  We 

arrived at patient bedside, anesthetist was attempting to place the oral 

endotracheal tube back into position using a glide scope.  It was apparent that 

the situation was deemed unstable from a airway standpoint, and emergent 

tracheostomy was required.





Patient was able to be facemask ventilated to the patient was taken emergently 

to the operating room, room 2, where he was placed in semi-common position; due 

to the patient's severe morbid obesity, and dysmorphic body type, it was 

impossible to layer in the supine position.  The arms were tucked in position 

the patient was secured to the table with tape.  All the while the patient being

facemask bagged by the anesthetist.





The neck was then prepped and draped in sterile fashion, timeout called, and 

with the patient on approximately 45 degree angle, the neck was anesthetized 

with 1% plain lidocaine.  A transverse incision was made over the sternal notch,

subcutaneous tissue divided, and the best approximation of midline plane 

identified.  Using combination of electrocautery, traction and hemostat 

dissection, the strap muscles were open.





Unfortunately after getting into the deeper tissue planes of the neck, the 

patient developed a significant amount of venous bleeding.  This was a 

multifactorial mostly due to venous bleeding from intermediate size veins.  

However in order to optimize exposure, the neck incision was extended laterally,

and strap muscles clamped and divided with electrocautery on the patient's right

side to optimize exposure.  Again using retraction, as well as medium size 

hemoclips to secure the medium size neck veins, we are able to now gain exposure

to the pretracheal fascia.  This was divided with electrocautery, and a tracheal

hook was placed in the trachea.  The exact position was difficult to ascertain. 

An upside down U was cut in the neck with a #15 blade, a 2-0 Prolene suture was 

placed in the inferior tracheal flap, and the lumen to the trachea visualized.  

There was a significant amount of air moving spontaneously by the patient as 

this was done with the patient not paralyzed.





The #6 cuffed, nonfenestrated Shiley catheter was inserted position, cannulated 

removed the appropriate apparatus attached to the anesthesia machine.  Breath 

sounds were detected in the lung fields, and ventilation resistance decreased.  

However patient's saturations remained in the X these, and the PCO2 was in the 

10-12 range.





We secured the previously clamped right-sided strap muscles with 3-0 running 

suture.  Gently the retractors were removed, and hemostasis appeared to have 

been achieved.  The tracheostomy appliance was secured to the skin at 4 sites 

with 2-0 Prolene suture, the left side of the neck incision was approximated 

with 3 interrupted sutures, and Surgicel placed in the recesses of the wound.  A

strap was secured into position for reinforcement of the fixation of the trach 

to the patient's neck.





At this point the patient's saturation did improve to the low to mid 80s, and 

the PCO2 was coming up to the 15 range.  Chest x-ray performed in the OR showed 

the trach to be in satisfactory position.  The patient's related position and 

body habitus, it was difficult to ascertain what FNA of the left lung field was 

being ventilated.





At this point the patient was initially improved.  Additional labs were drawn 

and a decision was being made to mobilize the patient to the intensive care 

unit.

## 2019-03-25 NOTE — PDOC CONSULTATION
Consultation


Consult Date: 19


Attending physician:: YOHANNES GARDNER


Consult reason:: Acute on chronic respiratory failure





History of Present Illness


Admission Date/PCP: 


  19 13:30





  DAVE HARPER MD





History of Present Illness: 


LILIA HAMILTON is a 64 year old female,To ED with acute shortness of breath did 

not appear to resolve on CPAP attempts were made to intubate the patient was un

successful and she was subsequently taken to the OR where she received emergency

tracheostomy she is currently in the ICU profoundly hypoxic and bradycardic.  

She had a history of cirrhosis fatty liver being morbidly obese and currently 

had a large amount of ascites, which was drained please see surgical note.








Past Medical History


Cardiac Medical History: Reports: Congestive Heart Failure, Hyperlipidema, 

Hypertension - medicated


   Denies: Myocardial Infarction


Pulmonary Medical History: Reports: Asthma, Chronic Obstructive Pulmonary 

Disease (COPD)


Neurological Medical History: 


   Denies: Seizures


Endocrine Medical History: Reports: Diabetes Mellitus Type 2


GI Medical History: 


   Denies: Hepatitis, Hiatal Hernia


Hematology: 


   Denies: Anemia, Sickle Cell Disease





Past Surgical History


Past Surgical History: Reports:  Section, Cholecystectomy


   Denies: Amputation, Hysterectomy, Mastectomy, Pacemaker





Social History


Information Source: [facility] Records


Smoking Status: Former Smoker


Frequency of Alcohol Use: None


Hx Recreational Drug Use: No


Hx Prescription Drug Abuse: No





- Advance Directive


Resuscitation Status: Full Code





Family History


Family History: COPD, CVA, DM, Hyperlipidemia, Hypertension, Malignancy


Parental Family History Reviewed: No


Children Family History Reviewed: No


Sibling(s) Family History Reviewed.: No





Medication/Allergy


Allergies/Adverse Reactions: 


                                        





No Known Allergies Allergy (Verified 19 09:24)


   











Review of Systems


ROS unobtainable: Due to endotracheal tube, Due to mental status





Physical Exam


Vital Signs: 


                                        











Temp Pulse Resp BP Pulse Ox


 


    87   18   87/55 L  78 L


 


    19 09:23  19 12:18  19 12:18  19 15:02








                                 Intake & Output











 19





 06:59 06:59 06:59


 


Intake Total   62


 


Balance   62


 


Weight   171.8 kg











General appearance: PRESENT: no acute distress, disheveled, morbidly obese.  

ABSENT: cooperative


Head exam: PRESENT: atraumatic, normocephalic


Eye exam: PRESENT: conjunctiva pale.  ABSENT: EOMI, nystagmus, periorbital 

swelling, scleral icterus


Ear exam: PRESENT: other -   Blood in nostrils


Mouth exam: PRESENT: dry mucosa, neck supple, tongue midline


Neck exam: PRESENT: tracheostomy - Fresh surgical.  ABSENT: carotid bruit, full 

ROM, JVD, lymphadenopathy, meningismus, tenderness, thyromegaly, tracheal 

deviation, other


Respiratory exam: PRESENT: decreased breath sounds, prolonged expiratory phas, 

rales, rhonchi, wheezes.  ABSENT: retraction, stridor


Cardiovascular exam: PRESENT: bradycardia, RRR, +S1, +S2


Pulses: PRESENT: normal radial pulses


GI/Abdominal exam: PRESENT: soft


Gentrourinary exam: PRESENT: indwelling catheter


Extremities exam: PRESENT: pedal edema.  ABSENT: calf tenderness, clubbing, 

joint swelling


Musculoskeletal exam: ABSENT: ambulatory, deformity, dislocation


Neurological exam: ABSENT: awake


Skin exam: PRESENT: dry, warm





Results


Laboratory Results: 


                                        





                                 19 09:25 





                                 19 09:25 





                                        











  19





  09:25 09:25 09:25


 


WBC  17.5 H  


 


RBC  5.22  


 


Hgb  14.2  


 


Hct  43.6  


 


MCV  84  


 


MCH  27.3  


 


MCHC  32.6  


 


RDW  17.9 H  


 


Plt Count  510 H  


 


Seg Neutrophils %  81.4 H  


 


Lymphocytes %  11.2 L  


 


Monocytes %  6.5  


 


Eosinophils %  0.5  


 


Basophils %  0.4  


 


Absolute Neutrophils  14.2 H  


 


Absolute Lymphocytes  2.0  


 


Absolute Monocytes  1.1  


 


Absolute Eosinophils  0.1  


 


Absolute Basophils  0.1  


 


Carbonic Acid   


 


HCO3/H2CO3 Ratio   


 


ABG pH   


 


ABG pCO2   


 


ABG pO2   


 


ABG HCO3   


 


ABG O2 Saturation   


 


ABG Base Excess   


 


VBG pH    7.29 L


 


VBG pCO2    38.0


 


VBG HCO3    18.0 L


 


VBG Base Excess    -7.8


 


FiO2   


 


Sodium   133.0 L 


 


Potassium   4.4 


 


Chloride   97 L 


 


Carbon Dioxide   17 L 


 


Anion Gap   19 


 


BUN   34 H 


 


Creatinine   1.45 H 


 


Est GFR ( Amer)   44 L 


 


Est GFR (Non-Af Amer)   37 L 


 


Glucose   215 H 


 


Calcium   9.9 


 


Total Bilirubin   1.4 H 


 


AST   33 


 


ALT   20 


 


Alkaline Phosphatase   128 H 


 


Total Protein   7.1 


 


Albumin   3.6 


 


Fluid Type   


 


Fluid Source   


 


Fluid Color   


 


Fluid Appearance   


 


Fluid Viscosity   


 


Fluid WBC   


 


Fluid RBC   














  19





  09:25 10:50 13:37


 


WBC   


 


RBC   


 


Hgb   


 


Hct   


 


MCV   


 


MCH   


 


MCHC   


 


RDW   


 


Plt Count   


 


Seg Neutrophils %   


 


Lymphocytes %   


 


Monocytes %   


 


Eosinophils %   


 


Basophils %   


 


Absolute Neutrophils   


 


Absolute Lymphocytes   


 


Absolute Monocytes   


 


Absolute Eosinophils   


 


Absolute Basophils   


 


Carbonic Acid  0.98 L   2.03 H


 


HCO3/H2CO3 Ratio  17:1   7:1


 


ABG pH  7.33 L   6.98 L*


 


ABG pCO2  32.5 L   67.4 H


 


ABG pO2  51.8 L   59.4 L


 


ABG HCO3  16.8 L   15.6 L


 


ABG O2 Saturation  84.8 L   74.2 L


 


ABG Base Excess  -8.0   -16.6


 


VBG pH   


 


VBG pCO2   


 


VBG HCO3   


 


VBG Base Excess   


 


FiO2  100%   100%


 


Sodium   


 


Potassium   


 


Chloride   


 


Carbon Dioxide   


 


Anion Gap   


 


BUN   


 


Creatinine   


 


Est GFR ( Amer)   


 


Est GFR (Non-Af Amer)   


 


Glucose   


 


Calcium   


 


Total Bilirubin   


 


AST   


 


ALT   


 


Alkaline Phosphatase   


 


Total Protein   


 


Albumin   


 


Fluid Type   PERITONEAL 


 


Fluid Source   ABDOMEN 


 


Fluid Color   STRAW 


 


Fluid Appearance   CLOUDY 


 


Fluid Viscosity   LIQUID 


 


Fluid WBC   167 


 


Fluid RBC   772 








                                        











  19





  09:25 09:25


 


Creatine Kinase  30 


 


CK-MB (CK-2)   1.56


 


Troponin I   0.176











Impressions: 


                                        





Chest X-Ray  19 13:21


IMPRESSION:  Tracheostomy tube.  Cannot exclude left upper lobe pneumonia.  

Limited study.


 














Assessment & Plan





- Diagnosis


(1) Cardiac arrest


Is this a current diagnosis for this admission?: Yes   


Plan: 


unsucessful 








(2) Acute respiratory failure with hypoxia


Is this a current diagnosis for this admission?: Yes   


Plan: 


unable to oxgenate or ventilate








(3) Morbid obesity with BMI of 70 and over, adult


Is this a current diagnosis for this admission?: Yes   


Plan: 


certainly a contributing factor








- Time


Total Critical Time (Minutes): 65

## 2019-03-25 NOTE — ER DOCUMENT REPORT
ED Respiratory Problem





- General


Chief Complaint: Respiratory Distress


Stated Complaint: RESPIRATORY DISTRESS


Time Seen by Provider: 19 09:35


Notes: 





Patient brought in by EMS this morning for difficulty breathing.  She says it 

started this morning.  She felt no significant difficulty breathing yesterday.  

Significant history is that the patient was seen here for abdominal pains and 

transferred to [facility] on 2019, where she was found to have cirrhosis of

the liver with ascites.  She has had the ascites drained every couple of weeks 

and was maintained at [facility] or at the rehab facility after [facility] and had her last 

paracentesis done about , the day before she was discharged home.  She 

has not had another paracentesis.  Denies any chest pains.  Denies any vomiting 

or diarrhea.





EMS found patient with an O2 sat in the mid 70s.  They placed her on a 

nonrebreather with oxygen transported her here.  Upon arrival, patient's O2 sat 

is about 79-80% on rebreather of oxygen.  Patient had BiPAP started and O2 sat 

was in the mid 80s.  Chest x-ray was normal.








TRAVEL OUTSIDE OF THE [place] IN LAST 30 DAYS: No





- Related Data


Allergies/Adverse Reactions: 


                                        





No Known Allergies Allergy (Verified 19 09:24)


   











Past Medical History





- Social History


Smoking Status: Former Smoker


Frequency of alcohol use: None


Drug Abuse: None


Family History: Reviewed & Not Pertinent, COPD, CVA, DM, Hyperlipidemia, 

Hypertension, Malignancy


Patient has suicidal ideation: No


Patient has homicidal ideation: No





- Past Medical History


Cardiac Medical History: Reports: Hx Congestive Heart Failure, Hx 

Hypercholesterolemia, Hx Hypertension - medicated


Pulmonary Medical History: Reports: Hx Asthma, Hx COPD


Neurological Medical History: Denies: Hx Cerebrovascular Accident, Hx Seizures


Endocrine Medical History: Reports: Hx Diabetes Mellitus Type 2


GI Medical History: Reports: Hx Cirrhosis - Diagnosed while she was at [facility].  

Has been undergoing biweekly paracenteses


Past Surgical History: Reports: Hx  Section, Hx Cholecystectomy





Review of Systems





- Review of Systems


-: Yes ROS unobtainable due to patient's medical condition - Patient is too s

hort of breath and has on a BiPAP mask, unable totalk.





Physical Exam





- Vital signs


Vitals: 


                                        











Resp Pulse Ox


 


 23 H  80 L


 


 19 09:20  19 09:20











Interpretation: Hypotensive, Hypoxic.  No: Febrile


Notes: 





PHYSICAL EXAMINATION:





GENERAL: Awake, anxious, unable to talk because she is so short of breath.  

Profoundly hypoxic.





HEAD: Atraumatic, normocephalic.





EYES: Pupils equal round and reactive to light, extraocular movements intact.





ENT: oropharynx clear without exudates.  Moist mucous membranes.





NECK: Normal range of motion, supple.





LUNGS: Breath sounds clear and equal bilaterally.  No wheezes, rhonchi, rales 

heard on either side.  Persistent hypoxia with O2 sat never above about mid 80s.





HEART: Regular rate and rhythm without murmurs.





ABDOMEN: Soft, nontender.  No guarding or rebound.  No masses.  Massively 

swollen abdomen with fluid palpated and percussed.





BACK:  No tenderness throughout entire back.





EXTREMITIES: Normal range of motion without pain.





NEUROLOGICAL: Awake and alert and seems to understand questions put to her.  

Moving all 4 extremities.  Seems to be aware of where she is not what is 

happening.  





PSYCH: Normal mood, normal affect.





SKIN: Warm, dry, no rashes.





Course





- Re-evaluation


Re-evalutation: 





19 19:37


Because of the persistent hypoxia, initially suggested to the patient that she 

might need to be intubated and after I explained to her what that meant, she 

said that she did not want to be intubated.  She initially declined the BiPAP 

because she said it was too uncomfortable.  Was able to talk her into the BiPAP 

initially.





With persistence of her hypoxia in spite of being intubated by anesthesia, it 

was decided to remove the tube and try to replace it and reposition it.  When 

that was unsuccessful, surgeon on call advised doing a trach on the patient in 

the OR and the patient was taken to the OR and out of the emergency department.





Dr. Hastings, this patient's primary care provider, was contacted and made aware

of the patient going to the OR and went to the ICU,  patient had been given 

Rocephin IV for antibiotic coverage.





Paracentesis performed by Dr. Boone, cloudy fluid obtained.





- Vital Signs


Vital signs: 


                                        











Temp Pulse Resp BP Pulse Ox


 


    87   18   87/55 L  78 L


 


    19 09:23  19 12:18  19 12:18  19 15:02














- Laboratory


Result Diagrams: 


                                 19 09:25





                                 19 09:25


Laboratory results interpreted by me: 


                                        











  19





  09:25 09:25 09:25


 


WBC  17.5 H  


 


RDW  17.9 H  


 


Plt Count  510 H  


 


Seg Neutrophils %  81.4 H  


 


Lymphocytes %  11.2 L  


 


Absolute Neutrophils  14.2 H  


 


Carbonic Acid   


 


ABG pH   


 


ABG pCO2   


 


ABG pO2   


 


ABG HCO3   


 


ABG Total CO2   


 


ABG O2 Saturation   


 


VBG pH    7.29 L


 


VBG HCO3    18.0 L


 


Sodium   133.0 L 


 


Chloride   97 L 


 


Carbon Dioxide   17 L 


 


BUN   34 H 


 


Creatinine   1.45 H 


 


Est GFR ( Amer)   44 L 


 


Est GFR (Non-Af Amer)   37 L 


 


Glucose   215 H 


 


Total Bilirubin   1.4 H 


 


Alkaline Phosphatase   128 H 














  19





  09:25


 


WBC 


 


RDW 


 


Plt Count 


 


Seg Neutrophils % 


 


Lymphocytes % 


 


Absolute Neutrophils 


 


Carbonic Acid  0.98 L


 


ABG pH  7.33 L


 


ABG pCO2  32.5 L


 


ABG pO2  51.8 L


 


ABG HCO3  16.8 L


 


ABG Total CO2  17.7 L


 


ABG O2 Saturation  84.8 L


 


VBG pH 


 


VBG HCO3 


 


Sodium 


 


Chloride 


 


Carbon Dioxide 


 


BUN 


 


Creatinine 


 


Est GFR ( Amer) 


 


Est GFR (Non-Af Amer) 


 


Glucose 


 


Total Bilirubin 


 


Alkaline Phosphatase 














Critical Care Note





- Critical Care Note


Total time excluding time spent on procedures (mins): 60





Discharge





- Discharge


Clinical Impression: 


 Acute respiratory failure with hypoxia





Dyspnea


Qualifiers:


 Dyspnea type: unspecified Qualified Code(s): R06.00 - Dyspnea, unspecified





Condition: Critical


Disposition: ADMITTED AS INPATIENT


Admitting Provider: Venkata


Unit Admitted: ICU

## 2019-03-25 NOTE — EKG REPORT
SEVERITY:- ABNORMAL ECG -

SINUS RHYTHM

RBBB AND LPFB

:

Confirmed by: Rosa Quinn MD 25-Mar-2019 21:23:56